# Patient Record
Sex: MALE | Race: WHITE | NOT HISPANIC OR LATINO | Employment: OTHER | URBAN - METROPOLITAN AREA
[De-identification: names, ages, dates, MRNs, and addresses within clinical notes are randomized per-mention and may not be internally consistent; named-entity substitution may affect disease eponyms.]

---

## 2017-03-16 ENCOUNTER — ALLSCRIPTS OFFICE VISIT (OUTPATIENT)
Dept: OTHER | Facility: OTHER | Age: 82
End: 2017-03-16

## 2017-07-17 ENCOUNTER — ALLSCRIPTS OFFICE VISIT (OUTPATIENT)
Dept: OTHER | Facility: OTHER | Age: 82
End: 2017-07-17

## 2017-07-17 DIAGNOSIS — F03.90 DEMENTIA WITHOUT BEHAVIORAL DISTURBANCE (HCC): ICD-10-CM

## 2017-08-22 ENCOUNTER — GENERIC CONVERSION - ENCOUNTER (OUTPATIENT)
Dept: OTHER | Facility: OTHER | Age: 82
End: 2017-08-22

## 2017-08-23 LAB
FOLATE SERPL-MCNC: >20 NG/ML
VIT B12 SERPL-MCNC: 365 PG/ML (ref 211–946)

## 2017-12-06 ENCOUNTER — ALLSCRIPTS OFFICE VISIT (OUTPATIENT)
Dept: OTHER | Facility: OTHER | Age: 82
End: 2017-12-06

## 2017-12-07 NOTE — PROGRESS NOTES
Assessment    1  Dementia (294 20) (F03 90)    Plan  Dementia    · Donepezil HCl - 10 MG Oral Tablet (Aricept); TAKE 1 TABLET BY MOUTH DAILYWITH FOOD   Rx By: Tasha Dumont; Dispense: 30 Days ; #:30 Tablet; Refill: 4;For: Dementia; MARY = N; Verified Transmission to [facility]; Last Updated By: System, SureScripts; 12/6/2017 1:19:46 PM   · Memantine HCl - 10 MG Oral Tablet; take one tablet by mouth twice daily   Rx By: Tasha Dumont; Dispense: 30 Days ; #:60 Tablet; Refill: 3;Dementia; MARY = N; Verified Transmission to [facility]; Msg to Pharmacy: After completion of the titration pack; Last Updated By: System, SureScripts; 12/6/2017 1:19:46 PM   · Follow-up visit in 4 Months Evaluation and Treatment  Follow-up  Status: Hold For -Scheduling  Requested for: [id number]   Ordered;Dementia; Ordered By: Tasha Dumont Performed:  Due: [id number]    Discussion/Summary  Discussion Summary:   Patient was advised to continue with Aricept and Namenda, he was advised to continue with mentally stimulating exercises, also was advised to be under constant supervision, to take fall and safety precautions, go to the hospital if has any worsening symptoms and call me otherwise to see me back in 4 months and follow up with family physician  Counseling Documentation With Imm: The was counseled regarding diagnostic results,-- risk factor reductions,-- prognosis,-- patient and family education,-- risks and benefits of treatment options,-- importance of compliance with treatment  Medication SE Review and Pt Understands Tx: Possible side effects of new medications were reviewed with the patient/guardian today  The treatment plan was reviewed with the patient/guardian  The patient/guardian understands and agrees with the treatment plan      Chief Complaint  Chief Complaint Free Text Note Form: The patient returns today following up on Dementia        History of Present Illness  HPI: Patient is here in follow-up for his dementia, he is accompanied with his daughter, since his last visit according to the daughter he is doing good, his memory is about the same, no motor or sensory symptoms in upper or lower extremity, he lives by himself but the daughter lives next door, he has some urinary incontinence, sleep is not disturbed, mood is good, he is able to do his ADLs, no other complaints  Appetite is good  He does not drive  Review of Systems  Neurological ROS:  Constitutional: no changes in appetite  HEENT: no eye pain  Cardiovascular: no chest pain or pressure-- and-- no swelling in the arms or legs  Respiratory: no shortness of breath with or without exertion  Gastrointestinal: no abdominal pain  Genitourinary: incontinence-- and-- feelings of urinary urgency  Musculoskeletal: no head/neck/back pain  Integumentary no rash: Marcine Grow Psychiatric: no sleep problems  Hematologic/Lymphatic: no unusual bleeding  Neurological General: no headache,-- no trouble falling asleep-- and-- no awakening at night  Neurological Mental Status: difficulty expressing/understanding speech-- and-- memory problems  Neurological Cranial Nerves: blurry or double vision, but-- no vertigo or dizziness-- and-- no dysphagia  Neurological Coordination: no vertigo or dizziness  Neurological Sensory: numbness, but-- no pain  Neurological Gait: has not had falls  Active Problems  1  Dementia (294 20) (F03 90)   2  Heart attack (410 90) (I21 9)   3  Heart disease (429 9) (I51 9)   4  Memory difficulties (780 93) (R41 3)   5  Memory loss (780 93) (R41 3)   6  Thyroid disorder (246 9) (E07 9)    Surgical History  1  History of Cataract Surgery   2  History of Coronary Artery Quadruple Venous Bypass Graft   3  History of Total Hip Replacement    Family History  Mother    1  Family history of   Father    2  Family history of   Daughter    3  Family history of anemia (V18 2) (Z83 2)   4   Family history of fibromyalgia (V17 89) (Z82 69)   5  Family history of High cholesterol    Social History     · Never a smoker   · No alcohol use   · Occasional caffeine consumption   · Retired   ·     Current Meds   1  Aspirin Low Dose 81 MG TABS; TAKE 1 TABLET DAILY; Therapy: (Recorded:16Mar2017) to Recorded   2  Donepezil HCl - 10 MG Oral Tablet; TAKE 1 TABLET BY MOUTH DAILY WITH FOOD; Therapy: 20VPI0463 to (MQTIWGSU:[id number])  Requested for: 57Zii6196; Last Rx:[id number] Ordered   3  Furosemide 20 MG Oral Tablet; Take 1 tablet daily; Therapy: (Recorded:16Mar2017) to Recorded   4  Levothyroxine Sodium 50 MCG Oral Tablet; Take 1 tablet daily; Therapy: (Recorded:16Mar2017) to Recorded   5  Memantine HCl - 10 MG Oral Tablet; take one tablet by mouth twice daily; Therapy: 95XFD8162 to (Evaluate:14Nov2017)  Requested for: 10TXX7756; Last Rx:[id number] Ordered    Allergies  1  No Known Drug Allergies    2  No Known Environmental Allergies   3  No Known Food Allergies    Vitals  Signs   Recorded: 15YDO9946 12:47PM   Heart Rate: 58, L Radial  Pulse Quality: Regular, L Radial  Respiration: 16  Systolic: 991, LUE, Sitting  Diastolic: 68, LUE, Sitting  Height: 5 ft 6 in  Weight: 159 lb   BMI Calculated: 25 66  BSA Calculated: 1 81    Physical Exam   Constitutional  General appearance: No acute distress, well appearing and well nourished  Eyes  Ophthalmoscopic examination: Vision is grossly normal  Gross visual field testing by confrontation shows no abnormalities  EOMI in both eyes  Conjunctivae clear  Eyelids normal palpebral fissures equal  Orbits exhibit normal position  No discharge from the eyes  PERRL  -- Funduscopic examination could not be done  Musculoskeletal  Gait and station: Normal gait, stance and balance  Muscle strength: Normal strength throughout  Neurologic  Orientation to person, place, and time: Normal  -- Arthur is 12/30    2nd cranial nerve: Normal    3rd, 4th, and 6th cranial nerves: Normal    5th cranial nerve: Normal    7th cranial nerve: Normal    8th cranial nerve: Abnormal  -- Patient has bilateral hearing aids    9th cranial nerve: Normal    11th cranial nerve: Normal    12th cranial nerve: Normal        Signatures   Electronically signed by : Murali Kimbrough MD; Dec  6 2017  1:24PM EST                       (Author)

## 2018-01-10 NOTE — PROCEDURES
Results/Data  Procedure: Electroencephalography (EEG)   Indications for the procedure include Memory Difficulties  Were discussed with the patient  Written consent was obtained prior to the procedure and is detailed in the patient's record  Prior to the start of the procedure, a time out was taken and the identity of the patient was confirmed via name and date of birth with the patient  The correct site(s) and the procedure to be performed were confirmed and the site(s) were marked appropriately  The positioning of the patient and the availabilty of the correct equipment were verified  Certain medications (such as anticonvulsants and tranquilizers), stimulants, and alcohol were avoided for at least 24-48 hours prior to the procedure  Procedure Note:   Performed by: Selam Escobar  Start Time: 3:30   End Time: 4:00  Electrode(s) Placement: Fp1, Fp2, F7, F3, Fz, F4, F8, T3, C3, CZ, C4, T4, T5, T6, P3, PZ, P4, O1, O2, A1 and A2  They were placed in a bipolar montage, referential montage, average reference montage, laplacian montage  The EEG was performed while the patient was exposed to of photic stimulation and awake and drowsy, but not hyperventilated and not sedated  Findings: EEG    This is a routine 18 channel EEG recording performed on an 51-year-old man  with a history of memory disturbance    Background activities during wakefulness consist of mid amplitude 7-7-1/2 cycle per second activities emanating from the posterior head region  These activities are reactive to eye opening  Intermingled with background activities are a moderate amount of lower amplitude beta activities emanating from the frontocentral head regions  Episodes of drowsiness are manifest by attenuation of background activities  Deeper stages of sleep were not seen    Photic stimulation was performed over a wide range of flash frequencies and produced a symmetrical driving response   Hyperventilation was not obtained    Concomitant EKG revealed a sinus rhythm  IMPRESSION: This is abnormal study due to slowing of background activities in the theta frequencies  This type of out amount he is consistent with diffuse cortical and subcortical dysfunction as seen in toxic, metabolic and/or neurodegenerative processes    TOAN Daily  Impression:    Post-Procedure:   the patient tolerated the procedure well  Complications: There were no complications        Signatures   Electronically signed by : Patria Londono MD; Aug 11 2016  4:29PM EST                       (Author)

## 2018-01-14 VITALS
BODY MASS INDEX: 27.32 KG/M2 | HEART RATE: 77 BPM | SYSTOLIC BLOOD PRESSURE: 116 MMHG | DIASTOLIC BLOOD PRESSURE: 64 MMHG | WEIGHT: 170 LBS | HEIGHT: 66 IN

## 2018-01-15 VITALS
HEART RATE: 76 BPM | BODY MASS INDEX: 25.74 KG/M2 | WEIGHT: 164 LBS | HEIGHT: 67 IN | SYSTOLIC BLOOD PRESSURE: 110 MMHG | DIASTOLIC BLOOD PRESSURE: 64 MMHG

## 2018-01-23 VITALS
RESPIRATION RATE: 16 BRPM | SYSTOLIC BLOOD PRESSURE: 130 MMHG | BODY MASS INDEX: 25.55 KG/M2 | HEIGHT: 66 IN | DIASTOLIC BLOOD PRESSURE: 68 MMHG | WEIGHT: 159 LBS | HEART RATE: 58 BPM

## 2018-01-25 DIAGNOSIS — F02.80 DEMENTIA ASSOCIATED WITH OTHER UNDERLYING DISEASE WITHOUT BEHAVIORAL DISTURBANCE (HCC): Primary | ICD-10-CM

## 2018-01-25 RX ORDER — DONEPEZIL HYDROCHLORIDE 10 MG/1
TABLET, FILM COATED ORAL
Qty: 30 TABLET | Refills: 0 | Status: SHIPPED | OUTPATIENT
Start: 2018-01-25 | End: 2018-06-29 | Stop reason: SDUPTHER

## 2018-04-23 DIAGNOSIS — R41.3 AMNESIA/MEMORY DISORDER: Primary | ICD-10-CM

## 2018-04-24 RX ORDER — MEMANTINE HYDROCHLORIDE 10 MG/1
TABLET ORAL
Qty: 60 TABLET | Refills: 0 | Status: SHIPPED | OUTPATIENT
Start: 2018-04-24 | End: 2018-05-26 | Stop reason: SDUPTHER

## 2018-04-30 ENCOUNTER — OFFICE VISIT (OUTPATIENT)
Dept: NEUROLOGY | Facility: CLINIC | Age: 83
End: 2018-04-30
Payer: MEDICARE

## 2018-04-30 VITALS
WEIGHT: 166 LBS | SYSTOLIC BLOOD PRESSURE: 132 MMHG | HEIGHT: 68 IN | DIASTOLIC BLOOD PRESSURE: 62 MMHG | BODY MASS INDEX: 25.16 KG/M2 | HEART RATE: 60 BPM

## 2018-04-30 DIAGNOSIS — F02.80 LATE ONSET ALZHEIMER'S DISEASE WITHOUT BEHAVIORAL DISTURBANCE (HCC): Primary | ICD-10-CM

## 2018-04-30 DIAGNOSIS — G30.1 LATE ONSET ALZHEIMER'S DISEASE WITHOUT BEHAVIORAL DISTURBANCE (HCC): Primary | ICD-10-CM

## 2018-04-30 PROCEDURE — 99213 OFFICE O/P EST LOW 20 MIN: CPT | Performed by: PSYCHIATRY & NEUROLOGY

## 2018-04-30 NOTE — PROGRESS NOTES
Sonido Johansen is a 80 y o  male  Chief Complaint   Patient presents with    Dementia       Assessment:  1  Late onset Alzheimer's disease without behavioral disturbance          Plan:    Discussion:  Patient and the family was advised for patient to continue with Aricept and Namenda, to be under constant supervision, to avoid being left alone, continue with mentally stimulating exercises, take fall and safety precautions, a DMV form was sent for patient not to drive, follow up with his other physicians, go to the hospital if has any worsening symptoms and call us otherwise to see me back in 6 months  Subjective:    HPI   Patient is accompanied with his daughter for his dementia, since his last visit he is doing reasonably okay, his daughter lives next door and takes care of the dad, and helps him with his ADLs, his appetite is good, he does not drive, sleep is good, he is on Aricept and Namenda, no other complaints  Vitals:    04/30/18 1511   BP: 132/62   BP Location: Left arm   Patient Position: Sitting   Cuff Size: Adult   Pulse: 60   Weight: 75 3 kg (166 lb)   Height: 5' 7 5" (1 715 m)       Current Medications    Current Outpatient Prescriptions:     aspirin (ASPIRIN LOW DOSE) 81 MG tablet, Take 1 tablet by mouth daily, Disp: , Rfl:     donepezil (ARICEPT) 10 mg tablet, TAKE 1 TABLET BY MOUTH DAILY WITH FOOD, Disp: 30 tablet, Rfl: 0    furosemide (LASIX) 20 mg tablet, Take 1 tablet by mouth daily, Disp: , Rfl:     levothyroxine 50 mcg tablet, Take 1 tablet by mouth daily, Disp: , Rfl:     memantine (NAMENDA) 10 mg tablet, TAKE 1 TABLET BY MOUTH TWICE DAILY, Disp: 60 tablet, Rfl: 0      Allergies  Patient has no known allergies      Past Medical History  Past Medical History:   Diagnosis Date    Dementia     [facility] [address] [facility]     Heart disease     Memory loss     Thyroid disease          Past Surgical History:  Past Surgical History:   Procedure Laterality Date    CATARACT EXTRACTION      CORONARY ARTERY BYPASS GRAFT      quadruple    TOTAL HIP ARTHROPLASTY           Family History:  Family History   Problem Relation Age of Onset    No Known Problems Mother     No Known Problems Father     Fibromyalgia Daughter     Anemia Daughter     Hyperlipidemia Daughter        Social History:   reports that he has never smoked  He has never used smokeless tobacco  He reports that he does not drink alcohol or use drugs  I have reviewed the past medical history, surgical history, social and family history, current medications, allergies vitals, review of systems, and updated this information as appropriate today  Objective:    Physical Exam    Neurological Exam    GENERAL:  Cooperative in no acute distress  Well-developed and well-nourished    HEAD and NECK   Head is atraumatic normocephalic with no lesions or masses  Neck is supple with full range of motion    CARDIOVASCULAR  Carotid Arteries-no carotid bruits  NEUROLOGIC:  Mental Status-the patient is awake alert and oriented without aphasia or apraxia, Kimberly is 9/30  Cranial Nerves: Visual fields are full to confrontation  Extraocular movements are full without nystagmus  Pupils are 2-1/2 mm and reactive  Face is symmetrical to light touch  Movements of facial expression move symmetrically  Hearing is normal to finger rub bilaterally  Soft palate lifts symmetrically  Shoulder shrug is symmetrical  Tongue is midline without atrophy  Motor: No drift is noted on arm extension  Strength is full in the upper and lower extremities with normal bulk and tone  Gait is unremarkable and needs help secondary to slight balance issues          ROS:  Review of Systems   HENT: Positive for hearing loss  Negative for trouble swallowing  Eyes: Negative for pain  Respiratory: Negative for shortness of breath  Cardiovascular: Negative for chest pain  Gastrointestinal: Negative for abdominal pain, constipation and diarrhea     Genitourinary: Positive for enuresis  Musculoskeletal: Negative for back pain and neck pain  Neurological: Positive for headaches  Negative for dizziness, tremors and numbness  Psychiatric/Behavioral: Negative for sleep disturbance

## 2018-05-01 ENCOUNTER — TELEPHONE (OUTPATIENT)
Dept: NEUROLOGY | Facility: CLINIC | Age: 83
End: 2018-05-01

## 2018-05-26 DIAGNOSIS — R41.3 AMNESIA/MEMORY DISORDER: ICD-10-CM

## 2018-05-29 RX ORDER — MEMANTINE HYDROCHLORIDE 10 MG/1
TABLET ORAL
Qty: 60 TABLET | Refills: 0 | Status: SHIPPED | OUTPATIENT
Start: 2018-05-29 | End: 2018-06-29 | Stop reason: SDUPTHER

## 2018-06-29 DIAGNOSIS — R41.3 AMNESIA/MEMORY DISORDER: ICD-10-CM

## 2018-06-29 DIAGNOSIS — F02.80 DEMENTIA ASSOCIATED WITH OTHER UNDERLYING DISEASE WITHOUT BEHAVIORAL DISTURBANCE (HCC): ICD-10-CM

## 2018-06-29 RX ORDER — DONEPEZIL HYDROCHLORIDE 10 MG/1
TABLET, FILM COATED ORAL
Qty: 30 TABLET | Refills: 0 | Status: SHIPPED | OUTPATIENT
Start: 2018-06-29 | End: 2018-08-02 | Stop reason: SDUPTHER

## 2018-06-29 RX ORDER — MEMANTINE HYDROCHLORIDE 10 MG/1
TABLET ORAL
Qty: 60 TABLET | Refills: 0 | Status: SHIPPED | OUTPATIENT
Start: 2018-06-29 | End: 2018-08-02 | Stop reason: SDUPTHER

## 2018-08-02 DIAGNOSIS — F02.80 DEMENTIA ASSOCIATED WITH OTHER UNDERLYING DISEASE WITHOUT BEHAVIORAL DISTURBANCE (HCC): ICD-10-CM

## 2018-08-02 DIAGNOSIS — R41.3 AMNESIA/MEMORY DISORDER: ICD-10-CM

## 2018-08-02 RX ORDER — MEMANTINE HYDROCHLORIDE 10 MG/1
TABLET ORAL
Qty: 60 TABLET | Refills: 0 | Status: SHIPPED | OUTPATIENT
Start: 2018-08-02 | End: 2018-09-03 | Stop reason: SDUPTHER

## 2018-08-02 RX ORDER — DONEPEZIL HYDROCHLORIDE 10 MG/1
TABLET, FILM COATED ORAL
Qty: 30 TABLET | Refills: 0 | Status: SHIPPED | OUTPATIENT
Start: 2018-08-02 | End: 2018-09-03 | Stop reason: SDUPTHER

## 2018-09-03 DIAGNOSIS — R41.3 AMNESIA/MEMORY DISORDER: ICD-10-CM

## 2018-09-03 DIAGNOSIS — F02.80 DEMENTIA ASSOCIATED WITH OTHER UNDERLYING DISEASE WITHOUT BEHAVIORAL DISTURBANCE (HCC): ICD-10-CM

## 2018-09-04 RX ORDER — DONEPEZIL HYDROCHLORIDE 10 MG/1
TABLET, FILM COATED ORAL
Qty: 30 TABLET | Refills: 0 | Status: SHIPPED | OUTPATIENT
Start: 2018-09-04 | End: 2018-12-20 | Stop reason: ALTCHOICE

## 2018-09-04 RX ORDER — MEMANTINE HYDROCHLORIDE 10 MG/1
TABLET ORAL
Qty: 60 TABLET | Refills: 0 | Status: SHIPPED | OUTPATIENT
Start: 2018-09-04 | End: 2018-12-14 | Stop reason: SDUPTHER

## 2018-10-18 DIAGNOSIS — F02.80 DEMENTIA ASSOCIATED WITH OTHER UNDERLYING DISEASE WITHOUT BEHAVIORAL DISTURBANCE (HCC): ICD-10-CM

## 2018-10-18 RX ORDER — DONEPEZIL HYDROCHLORIDE 10 MG/1
TABLET, FILM COATED ORAL
Qty: 30 TABLET | Refills: 0 | Status: SHIPPED | OUTPATIENT
Start: 2018-10-18 | End: 2018-12-17 | Stop reason: SDUPTHER

## 2018-11-21 ENCOUNTER — TELEPHONE (OUTPATIENT)
Dept: NEUROLOGY | Facility: CLINIC | Age: 83
End: 2018-11-21

## 2018-11-21 NOTE — TELEPHONE ENCOUNTER
Note written twice since the 1st note I messed up on the daughters last name and corrected it in the 2nd note, please print 2nd note and give patient's daughter

## 2018-11-21 NOTE — TELEPHONE ENCOUNTER
Pt's daughter called stated that she is her father's caretaker because he can not be left alone and she received a notice for jury duty (next week), asking for a letter excusing her from this  She would like to pick this up  Please advise if agreeable  She would like a call when ready

## 2018-11-22 DIAGNOSIS — F02.80 DEMENTIA ASSOCIATED WITH OTHER UNDERLYING DISEASE WITHOUT BEHAVIORAL DISTURBANCE (HCC): ICD-10-CM

## 2018-11-23 RX ORDER — DONEPEZIL HYDROCHLORIDE 10 MG/1
TABLET, FILM COATED ORAL
Qty: 30 TABLET | Refills: 0 | OUTPATIENT
Start: 2018-11-23

## 2018-12-14 DIAGNOSIS — R41.3 AMNESIA/MEMORY DISORDER: ICD-10-CM

## 2018-12-14 RX ORDER — MEMANTINE HYDROCHLORIDE 10 MG/1
10 TABLET ORAL 2 TIMES DAILY
Qty: 60 TABLET | Refills: 3 | Status: SHIPPED | OUTPATIENT
Start: 2018-12-14

## 2018-12-17 DIAGNOSIS — F02.80 DEMENTIA ASSOCIATED WITH OTHER UNDERLYING DISEASE WITHOUT BEHAVIORAL DISTURBANCE (HCC): ICD-10-CM

## 2018-12-17 RX ORDER — DONEPEZIL HYDROCHLORIDE 10 MG/1
10 TABLET, FILM COATED ORAL DAILY
Qty: 30 TABLET | Refills: 5 | Status: SHIPPED | OUTPATIENT
Start: 2018-12-17

## 2018-12-20 ENCOUNTER — OFFICE VISIT (OUTPATIENT)
Dept: NEUROLOGY | Facility: CLINIC | Age: 83
End: 2018-12-20
Payer: MEDICARE

## 2018-12-20 VITALS
WEIGHT: 188.8 LBS | HEIGHT: 68 IN | HEART RATE: 64 BPM | BODY MASS INDEX: 28.61 KG/M2 | DIASTOLIC BLOOD PRESSURE: 72 MMHG | SYSTOLIC BLOOD PRESSURE: 132 MMHG

## 2018-12-20 DIAGNOSIS — F02.80 LATE ONSET ALZHEIMER'S DISEASE WITHOUT BEHAVIORAL DISTURBANCE (HCC): Primary | ICD-10-CM

## 2018-12-20 DIAGNOSIS — G30.1 LATE ONSET ALZHEIMER'S DISEASE WITHOUT BEHAVIORAL DISTURBANCE (HCC): Primary | ICD-10-CM

## 2018-12-20 PROCEDURE — 99213 OFFICE O/P EST LOW 20 MIN: CPT | Performed by: PSYCHIATRY & NEUROLOGY

## 2018-12-20 NOTE — PROGRESS NOTES
Maxi Mercado is a 80 y o  male  Chief Complaint   Patient presents with    Follow-up     Alzheimer's and Dementia       Assessment:  1  Late onset Alzheimer's disease without behavioral disturbance        Plan:    Discussion:  Patient was advised to continue with Aricept and Namenda, also was advised to continue with mentally stimulating exercises, daughter was advised that patient should not be left alone, also was advised to be under constant supervision and not to live alone, family understands that,, I told them if they need help they can contact office of aging, daughter tells me that she is going to be with him, he is getting blood work ordered by the family physician, I have advised the family to discuss with him and make sure the patient does not have any UTI, go to the hospital if has any worsening symptoms and call me otherwise to see me back in 4 months and follow up with his other physicians  Subjective:    HPI   Patient is here in follow-up accompanied with his daughter for history of dementia, since his last visit according to the daughter he is slightly worse, in the last few days her little more disoriented, but in the office he is able to recognize me and followed simple commands, he has occasionally incontinence of urine, no headaches no dizziness, appetite is decent, his last Hagerman was 9 on 10, no falls, he ambulates with a cane, he has lost 1 hearing aid and is waiting for the new one, no other complaints      Vitals:    12/20/18 1513   BP: 132/72   BP Location: Left arm   Patient Position: Sitting   Cuff Size: Standard   Pulse: 64   Weight: 85 6 kg (188 lb 12 8 oz)   Height: 5' 7 5" (1 715 m)       Current Medications    Current Outpatient Prescriptions:     aspirin (ASPIRIN LOW DOSE) 81 MG tablet, Take 1 tablet by mouth daily, Disp: , Rfl:     donepezil (ARICEPT) 10 mg tablet, Take 1 tablet (10 mg total) by mouth daily With food, Disp: 30 tablet, Rfl: 5    furosemide (LASIX) 20 mg tablet, Take 1 tablet by mouth daily, Disp: , Rfl:     levothyroxine 50 mcg tablet, Take 1 tablet by mouth daily, Disp: , Rfl:     memantine (NAMENDA) 10 mg tablet, Take 1 tablet (10 mg total) by mouth 2 (two) times a day, Disp: 60 tablet, Rfl: 3      Allergies  Patient has no known allergies  Past Medical History  Past Medical History:   Diagnosis Date    Dementia     Heart attack ([facility] [address] [facility]     Heart disease     Memory loss     Thyroid disease          Past Surgical History:  Past Surgical History:   Procedure Laterality Date    CATARACT EXTRACTION      CORONARY ARTERY BYPASS GRAFT      quadruple    TOTAL HIP ARTHROPLASTY           Family History:  Family History   Problem Relation Age of Onset    No Known Problems Mother     No Known Problems Father     Fibromyalgia Daughter     Anemia Daughter     Hyperlipidemia Daughter        Social History:   reports that he has never smoked  He has never used smokeless tobacco  He reports that he does not drink alcohol or use drugs  I have reviewed the past medical history, surgical history, social and family history, current medications, allergies vitals, review of systems, and updated this information as appropriate today  Objective:    Physical Exam    Neurological Exam    GENERAL:  Cooperative in no acute distress  Well-developed and well-nourished    HEAD and NECK   Head is atraumatic normocephalic with no lesions or masses  Neck is supple with full range of motion    CARDIOVASCULAR  Carotid Arteries-no carotid bruits  NEUROLOGIC:  Mental Status-the patient is awake alert and oriented to simple commands without aphasia or apraxia  Cranial Nerves: Visual fields are full to confrontation  Extraocular movements are full without nystagmus  Pupils are 2-1/2 mm and reactive  Face is symmetrical to light touch  Movements of facial expression move symmetrically  Hearing is decreased  to finger rub bilaterally  Soft palate lifts symmetrically   Shoulder shrug is symmetrical  Tongue is midline without atrophy  Motor: No drift is noted on arm extension  Strength is full in the upper and lower extremities with normal bulk and tone  Ambulates with a cane          ROS:  Review of Systems   Constitutional: Positive for fatigue  HENT: Positive for hearing loss (hearing aids )  Eyes:        Watery eyes  Double vision    Respiratory: Negative  Cardiovascular: Positive for leg swelling (both legs edema)  Gastrointestinal: Negative  Endocrine: Positive for cold intolerance  Genitourinary: Negative  Musculoskeletal: Negative  Skin: Negative  Allergic/Immunologic: Negative  Neurological: Positive for tremors (right arm) and speech difficulty (diffulty getting words out )  Increased memory loss in past 3 days per patient's daughter   Hematological: Bruises/bleeds easily (bruising easily on hands )  Psychiatric/Behavioral: Positive for confusion and decreased concentration  The patient is nervous/anxious (slight anxiety per daughter)

## 2019-01-10 DIAGNOSIS — R41.3 AMNESIA/MEMORY DISORDER: ICD-10-CM

## 2019-01-10 RX ORDER — MEMANTINE HYDROCHLORIDE 10 MG/1
TABLET ORAL
Qty: 60 TABLET | Refills: 0 | Status: SHIPPED | OUTPATIENT
Start: 2019-01-10 | End: 2019-06-17

## 2019-01-21 RX ORDER — NITROFURANTOIN 25; 75 MG/1; MG/1
100 CAPSULE ORAL 2 TIMES DAILY
Refills: 0 | Status: ON HOLD | COMMUNITY
Start: 2018-12-21 | End: 2019-02-05 | Stop reason: ALTCHOICE

## 2019-01-22 ENCOUNTER — OFFICE VISIT (OUTPATIENT)
Dept: GASTROENTEROLOGY | Facility: CLINIC | Age: 84
End: 2019-01-22
Payer: MEDICARE

## 2019-01-22 VITALS
DIASTOLIC BLOOD PRESSURE: 74 MMHG | WEIGHT: 161.8 LBS | HEART RATE: 70 BPM | BODY MASS INDEX: 24.97 KG/M2 | SYSTOLIC BLOOD PRESSURE: 122 MMHG

## 2019-01-22 DIAGNOSIS — R13.14 PHARYNGOESOPHAGEAL DYSPHAGIA: Primary | ICD-10-CM

## 2019-01-22 PROBLEM — R13.10 DYSPHAGIA: Status: ACTIVE | Noted: 2019-01-22

## 2019-01-22 PROCEDURE — 99203 OFFICE O/P NEW LOW 30 MIN: CPT | Performed by: INTERNAL MEDICINE

## 2019-01-22 RX ORDER — FUROSEMIDE 40 MG/1
40 TABLET ORAL DAILY
COMMUNITY
Start: 2019-01-21

## 2019-01-22 NOTE — PROGRESS NOTES
[facility] - Outpatient Consultation  Amol Figueroa 80 y o  male MRN: [id number]  Encounter: [id number]          ASSESSMENT AND PLAN:      1  Pharyngoesophageal dysphagia  Worsening difficulty swallowing food - although it is difficult to discern it seems it may be oropharyngeal    Will plan EGD    ______________________________________________________________________    HPI:  80year old male presents with complaints of difficulty swallowing solids over the past few months  There is no pain or heartburn associated with this  He denies any choking sensation  No nausea/vomiting/hematemesis  HE has lost approximately 12lbs over the past few months  He has been diagnosed with Alzheimer's which, per his daughter, has been advancing  REVIEW OF SYSTEMS:    CONSTITUTIONAL: Denies any fever, chills, rigors, and weight loss  HEENT: No earache or tinnitus  Denies hearing loss or visual disturbances  CARDIOVASCULAR: No chest pain or palpitations  RESPIRATORY: Denies any cough, hemoptysis, shortness of breath or dyspnea on exertion  GASTROINTESTINAL: As noted in the History of Present Illness  GENITOURINARY: No problems with urination  Denies any hematuria or dysuria  NEUROLOGIC: No dizziness or vertigo, denies headaches  MUSCULOSKELETAL: Denies any muscle or joint pain  SKIN: Denies skin rashes or itching  ENDOCRINE: Denies excessive thirst  Denies intolerance to heat or cold  PSYCHOSOCIAL: Denies depression or anxiety  Denies any recent memory loss         Historical Information   Past Medical History:   Diagnosis Date    Dementia     Heart attack [facility] [address]     Heart disease     Memory loss     Thyroid disease      Past Surgical History:   Procedure Laterality Date    CATARACT EXTRACTION      CORONARY ARTERY BYPASS GRAFT      quadruple    TOTAL HIP ARTHROPLASTY       Social History   History   Alcohol Use No     History   Drug Use No     History   Smoking Status    Never Smoker   Smokeless Tobacco    Never Used     Family History   Problem Relation Age of Onset    No Known Problems Mother     No Known Problems Father     Fibromyalgia Daughter     Anemia Daughter     Hyperlipidemia Daughter        Meds/Allergies       Current Outpatient Prescriptions:     aspirin (ASPIRIN LOW DOSE) 81 MG tablet    donepezil (ARICEPT) 10 mg tablet    levothyroxine 50 mcg tablet    memantine (NAMENDA) 10 mg tablet    memantine (NAMENDA) 10 mg tablet    nitrofurantoin (MACROBID) 100 mg capsule    furosemide (LASIX) 40 mg tablet    No Known Allergies        Objective     Blood pressure 122/74, pulse 70, weight 73 4 kg (161 lb 12 8 oz)  Body mass index is 24 97 kg/m²  PHYSICAL EXAM:      General Appearance:   Alert, cooperative, no distress   HEENT:   Normocephalic, atraumatic, anicteric      Neck:  Supple, symmetrical, trachea midline   Lungs:   Clear to auscultation bilaterally; no rales, rhonchi or wheezing; respirations unlabored    Heart[de-identified]   Regular rate and rhythm; no murmur, rub, or gallop  Abdomen:   Soft, non-tender, non-distended; normal bowel sounds; no masses, no organomegaly    Genitalia:   Deferred    Rectal:   Deferred    Extremities:  No cyanosis, clubbing or edema    Pulses:  2+ and symmetric    Skin:  No jaundice, rashes, or lesions    Lymph nodes:  No palpable cervical lymphadenopathy        Lab Results:   No visits with results within 1 Day(s) from this visit  Latest known visit with results is:   Generic Conversion - Encounter on 08/22/2017   Component Date Value    Vitamin B-12 08/22/2017 365     Folate 08/22/2017 >20 0          Radiology Results:   No results found

## 2019-01-24 ENCOUNTER — ANESTHESIA (OUTPATIENT)
Dept: PERIOP | Facility: HOSPITAL | Age: 84
End: 2019-01-24

## 2019-01-24 ENCOUNTER — ANESTHESIA EVENT (OUTPATIENT)
Dept: PERIOP | Facility: HOSPITAL | Age: 84
End: 2019-01-24

## 2019-01-24 ENCOUNTER — TELEPHONE (OUTPATIENT)
Dept: GASTROENTEROLOGY | Facility: CLINIC | Age: 84
End: 2019-01-24

## 2019-01-24 NOTE — TELEPHONE ENCOUNTER
Pt called to reschedule egd that was supposed to be done this morning  Please call back to reschedule   thanks

## 2019-02-05 ENCOUNTER — HOSPITAL ENCOUNTER (OUTPATIENT)
Facility: HOSPITAL | Age: 84
Setting detail: OUTPATIENT SURGERY
Discharge: HOME | End: 2019-02-05
Attending: INTERNAL MEDICINE | Admitting: INTERNAL MEDICINE
Payer: MEDICARE

## 2019-02-05 ENCOUNTER — ANESTHESIA (OUTPATIENT)
Dept: PERIOP | Facility: HOSPITAL | Age: 84
End: 2019-02-05
Payer: MEDICARE

## 2019-02-05 ENCOUNTER — ANESTHESIA EVENT (OUTPATIENT)
Dept: PERIOP | Facility: HOSPITAL | Age: 84
End: 2019-02-05
Payer: MEDICARE

## 2019-02-05 VITALS
HEIGHT: 68 IN | RESPIRATION RATE: 19 BRPM | HEART RATE: 49 BPM | WEIGHT: 146.16 LBS | OXYGEN SATURATION: 100 % | DIASTOLIC BLOOD PRESSURE: 67 MMHG | SYSTOLIC BLOOD PRESSURE: 105 MMHG | BODY MASS INDEX: 22.15 KG/M2 | TEMPERATURE: 97.5 F

## 2019-02-05 PROCEDURE — 43248 EGD GUIDE WIRE INSERTION: CPT | Performed by: INTERNAL MEDICINE

## 2019-02-05 RX ORDER — PROPOFOL 10 MG/ML
INJECTION, EMULSION INTRAVENOUS AS NEEDED
Status: DISCONTINUED | OUTPATIENT
Start: 2019-02-05 | End: 2019-02-05 | Stop reason: SURG

## 2019-02-05 RX ORDER — LIDOCAINE HYDROCHLORIDE 10 MG/ML
INJECTION, SOLUTION EPIDURAL; INFILTRATION; INTRACAUDAL; PERINEURAL AS NEEDED
Status: DISCONTINUED | OUTPATIENT
Start: 2019-02-05 | End: 2019-02-05 | Stop reason: SURG

## 2019-02-05 RX ORDER — SODIUM CHLORIDE, SODIUM LACTATE, POTASSIUM CHLORIDE, CALCIUM CHLORIDE 600; 310; 30; 20 MG/100ML; MG/100ML; MG/100ML; MG/100ML
125 INJECTION, SOLUTION INTRAVENOUS CONTINUOUS
Status: DISCONTINUED | OUTPATIENT
Start: 2019-02-05 | End: 2019-02-05 | Stop reason: HOSPADM

## 2019-02-05 RX ADMIN — PROPOFOL 100 MG: 10 INJECTION, EMULSION INTRAVENOUS at 09:08

## 2019-02-05 RX ADMIN — LIDOCAINE HYDROCHLORIDE 20 MG: 10 INJECTION, SOLUTION EPIDURAL; INFILTRATION; INTRACAUDAL; PERINEURAL at 09:08

## 2019-02-05 RX ADMIN — PROPOFOL 20 MG: 10 INJECTION, EMULSION INTRAVENOUS at 09:12

## 2019-02-05 RX ADMIN — PROPOFOL 10 MG: 10 INJECTION, EMULSION INTRAVENOUS at 09:10

## 2019-02-05 RX ADMIN — SODIUM CHLORIDE, SODIUM LACTATE, POTASSIUM CHLORIDE, AND CALCIUM CHLORIDE 125 ML/HR: .6; .31; .03; .02 INJECTION, SOLUTION INTRAVENOUS at 08:43

## 2019-02-05 NOTE — H&P
History and Physical -  [facility]  Laquita Johnston 80 y o  male MRN: [id number]      HPI: Laquita Johnston is a 80y o  year old male who presents for the evaluation of dysphagia      REVIEW OF SYSTEMS: Per the HPI, and otherwise unremarkable  Historical Information   Past Medical History:   Diagnosis Date    Dementia     Heart attack ([address] )     Heart disease     Memory loss     Thyroid disease      Past Surgical History:   Procedure Laterality Date    CATARACT EXTRACTION      CORONARY ARTERY BYPASS GRAFT      quadruple    TOTAL HIP ARTHROPLASTY       Social History   History   Alcohol Use No     History   Drug Use No     History   Smoking Status    Never Smoker   Smokeless Tobacco    Never Used     Family History   Problem Relation Age of Onset    No Known Problems Mother     No Known Problems Father     Fibromyalgia Daughter     Anemia Daughter     Hyperlipidemia Daughter        Meds/Allergies     Prescriptions Prior to Admission   Medication    aspirin (ASPIRIN LOW DOSE) 81 MG tablet    donepezil (ARICEPT) 10 mg tablet    furosemide (LASIX) 40 mg tablet    levothyroxine 50 mcg tablet    memantine (NAMENDA) 10 mg tablet    memantine (NAMENDA) 10 mg tablet       No Known Allergies    Objective     Blood pressure 129/56, pulse 59, temperature 97 6 °F (36 4 °C), temperature source Oral, resp  rate 21, height 5' 8" (1 727 m), weight 66 3 kg (146 lb 2 6 oz), SpO2 99 %  PHYSICAL EXAM    Gen: NAD  CV: RRR  CHEST: Clear  ABD: soft, NT/ND  EXT: no edema      ASSESSMENT/PLAN:  This is a 80y o  year old male here for the evaluation of dysphagia, and he is stable and optimized for his procedure      Perform esophagogastroduodenoscopy with probable dilation of the esophagus

## 2019-02-05 NOTE — ANESTHESIA POSTPROCEDURE EVALUATION
Post-Op Assessment Note      CV Status:  Stable    Mental Status:  Lethargic    Hydration Status:  Stable    PONV Controlled:  None    Airway Patency:  Patent and adequate    Post Op Vitals Reviewed: Yes          Staff: CRNA       Comments: 6lpm/mask          BP   120/70   Temp      Pulse  55   Resp   16   SpO2   99

## 2019-02-05 NOTE — DISCHARGE INSTRUCTIONS
Upper Endoscopy   WHAT YOU NEED TO KNOW:   An upper endoscopy is also called an upper gastrointestinal (GI) endoscopy, or an esophagogastroduodenoscopy (EGD)  You may feel bloated, gassy, or have some abdominal discomfort after your procedure  Your throat may be sore for 24 to 36 hours  You may burp or pass gas from air that is still inside your body  DISCHARGE INSTRUCTIONS:   Call 911 for any of the following:   · You have sudden chest pain or trouble breathing  Seek care immediately if:   · You feel dizzy or faint  · You have trouble swallowing  · Your bowel movements are very dark or black  · Your abdomen is hard and firm and you have severe pain  · You vomit blood  Contact your healthcare provider if:   · You feel full or bloated and cannot burp or pass gas  · You have not had a bowel movement for 3 days after your procedure  · You have neck pain  · You have a fever or chills  · You have nausea or are vomiting  · You have a rash or hives  · You have questions or concerns about your endoscopy  Relieve a sore throat:  Suck on throat lozenges or crushed ice  Gargle with a small amount of warm salt water  Mix 1 teaspoon of salt and 1 cup of warm water to make salt water  Relieve gas and discomfort from bloating:  Lie on your right side with a heating pad on your abdomen  Take short walks to help pass gas  Eat small meals until bloating is relieved  Rest after your procedure: You have been given medicine to relax you  Do not  drive or make important decisions until the day after your procedure  Return to your normal activity as directed  You can usually return to work the day after your procedure  Follow up with your healthcare provider as directed:  Write down your questions so you remember to ask them during your visits     © 2017 [address] is for End User's use only and may not be sold, redistributed or otherwise used for commercial purposes  All illustrations and images included in CareNotes® are the copyrighted property of A D A M , Inc  or [employer] Wor[employer]  The above information is an  only  It is not intended as medical advice for individual conditions or treatments  Talk to your doctor, nurse or pharmacist before following any medical regimen to see if it is safe and effective for you

## 2019-02-05 NOTE — ANESTHESIA PREPROCEDURE EVALUATION
Review of Systems/Medical History  Patient summary reviewed  Chart reviewed      Cardiovascular  Past MI > 6 months, CAD , CAD status: 3VD, History of CABG,    Pulmonary  Negative pulmonary ROS        GI/Hepatic  Negative GI/hepatic ROS          Negative  ROS        Endo/Other  History of thyroid disease ,      GYN  Negative gynecology ROS          Hematology  Negative hematology ROS      Musculoskeletal       Neurology  Negative neurology ROS      Psychology   Negative psychology ROS              Physical Exam    Airway    Mallampati score: II  TM Distance: <3 FB  Neck ROM: limited     Dental   upper dentures and lower dentures,     Cardiovascular  Rhythm: regular, Rate: normal, Murmur,     Pulmonary  Breath sounds clear to auscultation,     Other Findings  Difficulty swallowing       Anesthesia Plan  ASA Score- 3     Anesthesia Type- IV sedation with anesthesia with ASA Monitors  Additional Monitors:   Airway Plan:         Plan Factors-    Induction- intravenous  Postoperative Plan- Plan for postoperative opioid use  Informed Consent- Anesthetic plan and risks discussed with patient and spouse  I personally reviewed this patient with the CRNA  Discussed and agreed on the Anesthesia Plan with the CRNA  Shirley Larson

## 2019-02-05 NOTE — OP NOTE
ESOPHAGOGASTRODUODENOSCOPY    PROCEDURE: EGD    SEDATION: Monitored anesthesia care, check anesthesia records    ASA Class: 3    INDICATIONS:  Dysphagia to solid foods    CONSENT:  Informed consent was obtained for the procedure, including sedation after explaining the risks and benefits of the procedure  Risks including but not limited to bleeding, perforation, infection, and missed lesion  PREPARATION:   Telemetry, pulse oximetry, blood pressure were monitored throughout the procedure  Patient was identified by myself both verbally and by visual inspection of ID band  DESCRIPTION:   Patient was placed in the left lateral decubitus position and was sedated with the above medication  The gastroscope was introduced in to the oropharynx and the esophagus was intubated under direct visualization  Scope was passed down the esophagus up to 2nd part of the duodenum  A careful inspection was made as the gastroscope was withdrawn, including a retroflexed view of the stomach; findings and interventions are described below  FINDINGS:    #1  Esophagus- the proximal, mid, distal esophagus were normal in appearance  No esophageal stricture was identified  A 54 Filipino Savary dilator was passed effortlessly through the esophagus without resistance or apparent complications over a guidewire  There was no blood identified on the esophageal dilator at the completion of the dilation  #2  Stomach- the cardia, fundus, body, antrum, and pylorus were normal in appearance  The pylorus was patent and traversed without difficulty  #3  Duodenum- the 1st and 2nd portion of the duodenum were normal          IMPRESSIONS:      1  Dysphagia to solid foods with a normal appearing esophagus, status post empiric dilation with 54 Filipino Savary dilator with no apparent complications  RECOMMENDATIONS:     1  Resume regular diet without restrictions  2   If the patient's subjective complaint of dysphagia persists then consider esophageal manometry     COMPLICATIONS:  None; patient tolerated the procedure well      SPECIMENS:  * No specimens in log *    ESTIMATED BLOOD LOSS:  Minimal

## 2019-02-05 NOTE — DISCHARGE INSTR - AVS FIRST PAGE
ESOPHAGOGASTRODUODENOSCOPY    PROCEDURE: EGD    SEDATION: Monitored anesthesia care, check anesthesia records    ASA Class: 3    INDICATIONS:  Dysphagia to solid foods    CONSENT:  Informed consent was obtained for the procedure, including sedation after explaining the risks and benefits of the procedure  Risks including but not limited to bleeding, perforation, infection, and missed lesion  PREPARATION:   Telemetry, pulse oximetry, blood pressure were monitored throughout the procedure  Patient was identified by myself both verbally and by visual inspection of ID band  DESCRIPTION:   Patient was placed in the left lateral decubitus position and was sedated with the above medication  The gastroscope was introduced in to the oropharynx and the esophagus was intubated under direct visualization  Scope was passed down the esophagus up to 2nd part of the duodenum  A careful inspection was made as the gastroscope was withdrawn, including a retroflexed view of the stomach; findings and interventions are described below  FINDINGS:    #1  Esophagus- the proximal, mid, distal esophagus were normal in appearance  No esophageal stricture was identified  A 54 [place]ian Savary dilator was passed effortlessly through the esophagus without resistance or apparent complications over a guidewire  There was no blood identified on the esophageal dilator at the completion of the dilation  #2  Stomach- the cardia, fundus, body, antrum, and pylorus were normal in appearance  The pylorus was patent and traversed without difficulty  #3  Duodenum- the 1st and 2nd portion of the duodenum were normal          IMPRESSIONS:      1  Dysphagia to solid foods with a normal appearing esophagus, status post empiric dilation with 54 Lithuanian Savary dilator with no apparent complications  RECOMMENDATIONS:     1  Resume regular diet without restrictions  2   If the patient's subjective complaint of dysphagia persists then consider esophageal manometry     COMPLICATIONS:  None; patient tolerated the procedure well      SPECIMENS:  * No specimens in log *    ESTIMATED BLOOD LOSS:  Minimal

## 2019-02-21 ENCOUNTER — TELEPHONE (OUTPATIENT)
Dept: GASTROENTEROLOGY | Facility: CLINIC | Age: 84
End: 2019-02-21

## 2019-02-21 DIAGNOSIS — R13.12 OROPHARYNGEAL DYSPHAGIA: Primary | ICD-10-CM

## 2019-02-21 NOTE — TELEPHONE ENCOUNTER
Dr Sebastian Summers - Patient's EC called  Patient is having difficulty swallowing and eating  EGD performed on 02/05/19   Please call [phone] ty

## 2019-02-21 NOTE — TELEPHONE ENCOUNTER
Patient needs a speech and swallow evaluation - suspect his dysphagia is secondary to alzheimers +/- a transfer dysphagia

## 2019-02-21 NOTE — TELEPHONE ENCOUNTER
Called and LMOM advising to call back office so we can relay PA's message regarding pt  Message:  PA ordered a FL barium swallow video w speech and to call Central scheduling at [phone] to schedule appt

## 2019-03-07 ENCOUNTER — TELEPHONE (OUTPATIENT)
Dept: GASTROENTEROLOGY | Facility: CLINIC | Age: 84
End: 2019-03-07

## 2019-03-07 NOTE — TELEPHONE ENCOUNTER
Called to speak with pt but daughter answered and said she had to put pt in a facility  Daughter said her father is actually doing better with his swallowing since he has been in the facility

## 2019-03-12 ENCOUNTER — TELEPHONE (OUTPATIENT)
Dept: GASTROENTEROLOGY | Facility: CLINIC | Age: 84
End: 2019-03-12

## 2019-03-12 NOTE — TELEPHONE ENCOUNTER
Dr Rosalva Mcmillan _ Patient did not show for Video Swallow 03/12/19  Radiologist tried to contact patient, no answer

## 2019-03-12 NOTE — TELEPHONE ENCOUNTER
At phone number listed is the jimmy  Pt is now in an assisted living center  Jimmy was/is sick and was unable to take him  Gave daughter [facility] scheduling phone number

## 2019-03-21 ENCOUNTER — HOSPITAL ENCOUNTER (OUTPATIENT)
Dept: RADIOLOGY | Facility: HOSPITAL | Age: 84
Discharge: HOME | End: 2019-03-21
Attending: INTERNAL MEDICINE
Payer: MEDICARE

## 2019-03-21 DIAGNOSIS — R13.12 OROPHARYNGEAL DYSPHAGIA: ICD-10-CM

## 2019-03-21 PROCEDURE — 74230 X-RAY XM SWLNG FUNCJ C+: CPT

## 2019-03-21 PROCEDURE — 92611 MOTION FLUOROSCOPY/SWALLOW: CPT

## 2019-03-21 NOTE — PROCEDURES
Video Barium Swallow Study       Patient Name: Kalli Erwin  RJIAC'A Date: 3/21/2019        Past Medical History     Past Medical History:   Diagnosis Date    Dementia     Heart attack ([address] )     Heart disease     Memory loss     Thyroid disease         Past Surgical History  Past Surgical History:   Procedure Laterality Date    CATARACT EXTRACTION      CORONARY ARTERY BYPASS GRAFT      quadruple    IN ESOPHAGOGASTRODUODENOSCOPY TRANSORAL DIAGNOSTIC N/A 2/5/2019    Procedure: ESOPHAGOGASTRODUODENOSCOPY (EGD); Surgeon: Renu Vincent DO;  Location: [facility]; Service: Gastroenterology    TOTAL HIP ARTHROPLASTY             General Information: This 80 y o  male resident of [facility] was seen today on an outpatient basis  He was referred for a video swallow study by his gastroenterologist, MARK Howard , due to recurrent complaint of difficulty swallowing  The patient himself was confused and unable to provide history; history was provided by his daughter, Salazar Butcher, who accompanied him to the study  Salazar Butcher states that for the past few months, the patient has had occasional inability to swallow solids, with complaint of globus sensation ("it gets stuck in his throat")  This occurs several times per week  At times he has had to expectorate the bolus because he could not swallow it  She notes that he also coughs with liquid intake one or two times per week  The patient has not had any incidents of choking requiring the Heimlich maneuver, nor has he had any respiratory illness or pneumonia in the last few months  He was evaluated by Dr Tanya Almaraz and an EGD was performed on 2/5/19 which was completely normal  Therefore, the patient was referred for a video swallow study for further investigation of the complaint of dysphagia       Previous medical history includes the above and is primarily significant for dementia, which was diagnosed two years ago   Salazar Butcher notes that the patient's confusion has significantly increased in the last few months, concurrent with the increased difficulty in swallowing  The patient was previously living with her, but required placement for care a couple of months ago  Oscar Fontanez feels his swallowing has actually improved since he was placed at [facility] [address], although she is unsure why  He is on a Regular/Thin Liquid diet there       The study was conducted in lateral view for trials by mouth  Textures assessed during this study include nectar-thick liquids, thin liquids, puree, and regular solids  A variety of strategies were attempted during the study, including chin tuck, preparatory set, cued cough, and cued swallows  However, the patient did not follow commands or attempt any of these strategies even with maximum verbal, visual, and tactile cues        Oral Stage:  Base of tongue retraction was both delayed and reduced in range of motion across all trials  Velopharyngeal closure was within normal limits  The patient habitually tilted his head very far back during acceptance and initial bolus manipulation of all trials except regular solids  He was not responsive to verbal or tactile cues to keep his head level  [place]-Thick Liquids via Cup Sip (1/2 ounce in cup): Pt continued to attempt to drink more from cup long after entire 1/2 ounce bolus was in his mouth  Bolus transfer to the pyriform sinuses prior to initiation of the pharyngeal swallow, with concurrent shallow laryngeal penetration  Thin Liquids via Self-Regulated Cup Sip: Given an open cup, the patient completely filled his oral cavity with liquid until it began to leak from the oral cavity to the valleculae  Bolus transfer to the pyriform sinuses prior to initiation of the pharyngeal swallow, with concurrent deep laryngeal penetration completely filling the larynx to the level of the true vocal folds  Thin Liquids via Cup Sip (1/2 ounce in cup):  Pt continued to attempt to drink more from cup long after entire 1/2 ounce bolus was in his mouth  Bolus transfer to the pyriform sinuses prior to initiation of the pharyngeal swallow, with concurrent deep laryngeal penetration  Puree: Lingual pumping  Bolus transfer to the valleculae, with passive spillage from there to the pyriform sinuses, prior to initiation of the pharyngeal swallow  Regular Solids: Mastication lasting 22 seconds for 1-cm cube bolus  Slight leakage to valleculae during mastication            Pharyngeal Stage: An osteophyte was observed at the levels of C5-6-7 impinging on the pyriform sinuses, the upper esophageal sphincter, and proximal esophagus  This likely contributes to the patient's residuals as described below, as well as his complaint of difficulty swallowing  Epiglottic inversion and retraction were complete but sluggish  This also contributes to bolus retention and residuals  Hyolaryngeal elevation was minimal across all trials  Pharyngeal contraction was within normal limits  Nectar-Thick Liquids via Cup Sip (1/2 ounce in cup): Moderate residuals in the larynx, pyriform sinuses, and oral cavity after the swallow  The patient spontaneously and independently swallowed an additional three times, which had minimal effect on residuals  More than 90 seconds later, the patient spontaneously and independently cleared his throat and swallowed again, which successfully cleared all residuals  Thin Liquids via Self-Regulated Cup Sip: Severe pharyngeal residuals after the swallow, filling the larynx and pharynx to the level of the base of the epiglottis  Moderate oral residuals also observed  The patient spontaneously and independently swallowed an additional four times, after which only mild vallecular and pyriform sinus residuals remained  No aspiration was observed during this process, although there was no sensory response at any point to bolus contact with the vocal folds       Thin Liquids via Cup Sip (1/2 ounce in cup): Moderate residuals in the larynx, pyriform sinuses, and oral cavity after the swallow  The patient spontaneously and independently swallowed an additional three times, which cleared the residuals  No aspiration was observed during this process  Puree: Approximately 50% of the bolus passed into the esophagus during the swallow, with ~25% retained in the valleculae and ~25% retained in the pyriform sinuses  The patient spontaneously and independently performed a second swallow, which cleared the residuals  Regular Solids: Moderate oral, vallecular, and pyriform sinus residuals after the swallow  The patient spontaneously and independently swallowed an additional three times, which slowly and gradually cleared the residuals  Following the third of the four total swallows, a column of residuals was observed ranging from the pyriform sinuses through the upper esophageal sphincter and into the proximal esophagus  The patient reported "It's stuck, I don't know if I can swallow it " He then swallowed the final time and fully cleared the residuals  Esophageal Stage:  Not formally assessed  Assessment Summary:  The patient presents with moderate oral and pharyngeal dysphagia, characterized by impulsive intake, reduced base of tongue retraction, delayed pharyngeal swallow initiation, minimal hyolaryngeal elevation, sluggish epiglottic inversion, and suspected reduced sensation throughout the oral and pharyngeal space (including the larynx)  Incidental findings included an osteophyte impinging on the distal pharynx, upper esophageal sphincter, and proximal esophagus  No aspiration was observed during the study, although laryngeal penetration was seen with liquids, including penetration of thin liquids to the level of the true vocal folds without sensory response   Thickened liquids are actually not a safer alternative for this patient as he has more difficulty clearing them from his larynx when penetration occurs  He may benefit from speech therapy for dysphagia treatment if he is able to consistently participate (he had noted difficulty following commands during this exam)        Recommendations:  1  Soft solids and Thin Liquids  2  Small bites and sips at a slow rate of intake  3  Aspiration precautions, including upright positioning for all intake by mouth  4  May wish to consider speech therapy for treatment of dysphagia, although prognosis for improvement is guarded given the patient's dementia         Results and recommendations were discussed in detail with the patient's daughter, Ally Fischer, immediately following the study         [place], [id number] [facility]  Speech-Language Pathologist

## 2019-04-19 ENCOUNTER — TELEPHONE (OUTPATIENT)
Dept: NEUROLOGY | Facility: CLINIC | Age: 84
End: 2019-04-19

## 2019-06-03 ENCOUNTER — TELEPHONE (OUTPATIENT)
Dept: NEUROLOGY | Facility: CLINIC | Age: 84
End: 2019-06-03

## 2019-06-17 ENCOUNTER — HOSPITAL ENCOUNTER (EMERGENCY)
Facility: HOSPITAL | Age: 84
Discharge: HOME | End: 2019-06-18
Attending: EMERGENCY MEDICINE
Payer: MEDICARE

## 2019-06-17 VITALS
HEART RATE: 43 BPM | SYSTOLIC BLOOD PRESSURE: 117 MMHG | HEIGHT: 68 IN | WEIGHT: 141.54 LBS | TEMPERATURE: 97.7 F | OXYGEN SATURATION: 97 % | BODY MASS INDEX: 21.45 KG/M2 | DIASTOLIC BLOOD PRESSURE: 57 MMHG | RESPIRATION RATE: 16 BRPM

## 2019-06-17 DIAGNOSIS — R31.9 HEMATURIA: Primary | ICD-10-CM

## 2019-06-17 PROCEDURE — 99283 EMERGENCY DEPT VISIT LOW MDM: CPT

## 2019-06-17 RX ORDER — GUAIFENESIN 200 MG/1
400 TABLET ORAL 2 TIMES DAILY
COMMUNITY
End: 2020-09-16 | Stop reason: HOSPADM

## 2019-06-17 RX ORDER — SIMVASTATIN 40 MG
40 TABLET ORAL
COMMUNITY

## 2019-06-17 RX ORDER — POTASSIUM CHLORIDE 750 MG/1
10 CAPSULE, EXTENDED RELEASE ORAL DAILY
COMMUNITY
End: 2020-09-16 | Stop reason: HOSPADM

## 2019-06-18 PROBLEM — R31.9 HEMATURIA: Status: ACTIVE | Noted: 2019-06-18

## 2019-06-18 PROCEDURE — 99283 EMERGENCY DEPT VISIT LOW MDM: CPT | Performed by: EMERGENCY MEDICINE

## 2019-06-18 NOTE — ED NOTES
PATIENCE called at this time to set up transport to have pt brought back to [facility], will receive call back with transport information        Keely Cope RN  06/18/19 1923

## 2019-06-18 NOTE — ED PROVIDER NOTES
History  Chief Complaint   Patient presents with    Blood in Urine     coming from [facility], per staff pt had blood in urine, presents with no other complaints, pt poor historian with h/o dementia and [place]       Patient is 80-year-old male presents emergency department for evaluation regarding painless hematuria  Patient is a resident at [facility] and they noticed hematuria  Patient has not had any fevers  Patient is a poor historian  Prior to Admission Medications   Prescriptions Last Dose Informant Patient Reported? Taking?   aspirin (ASPIRIN LOW DOSE) 81 MG tablet  Self Yes Yes   Sig: Take 325 mg by mouth daily    donepezil (ARICEPT) 10 mg tablet  Self No Yes   Sig: Take 1 tablet (10 mg total) by mouth daily With food   furosemide (LASIX) 40 mg tablet  Self Yes Yes   Sig: Take 40 mg by mouth daily    guaiFENesin (ROBITUSSIN) 100 MG/5ML oral liquid   Yes Yes   Sig: Take 200 mg by mouth every 4 (four) hours as needed for cough   guaiFENesin 200 MG tablet   Yes Yes   Sig: Take 400 mg by mouth 2 (two) times a day   levothyroxine 50 mcg tablet  Self Yes Yes   Sig: Take 1 tablet by mouth daily   memantine (NAMENDA) 10 mg tablet  Self No Yes   Sig: Take 1 tablet (10 mg total) by mouth 2 (two) times a day   potassium chloride (MICRO-K) 10 MEQ CR capsule   Yes Yes   Sig: Take 10 mEq by mouth daily   simvastatin (ZOCOR) 40 mg tablet   Yes Yes   Sig: Take 40 mg by mouth daily at bedtime      Facility-Administered Medications: None       Past Medical History:   Diagnosis Date    Dementia     Heart attack ([address] )     Heart disease     Hyperlipidemia     Memory loss     Thyroid disease        Past Surgical History:   Procedure Laterality Date    CATARACT EXTRACTION      CORONARY ARTERY BYPASS GRAFT      quadruple    [place] ESOPHAGOGASTRODUODENOSCOPY TRANSORAL DIAGNOSTIC N/A 2/5/2019    Procedure: ESOPHAGOGASTRODUODENOSCOPY (EGD); Surgeon: Nhi Garcia DO;  Location[facility];   Service: Gastroenterology  TOTAL HIP ARTHROPLASTY         Family History   Problem Relation Age of Onset    No Known Problems Mother     No Known Problems Father     Fibromyalgia Daughter     Anemia Daughter     Hyperlipidemia Daughter      I have reviewed and agree with the history as documented  Social History     Tobacco Use    Smoking status: Never Smoker    Smokeless tobacco: Never Used   Substance Use Topics    Alcohol use: No    Drug use: No        Review of Systems   Unable to perform ROS: Dementia       Physical Exam  Physical Exam   Constitutional: He appears well-developed and well-nourished  Cardiovascular: Bradycardia present  Pulmonary/Chest: Effort normal and breath sounds normal    Abdominal: Soft  Bowel sounds are normal    Genitourinary:   Genitourinary Comments: Blood noted at the meatus  Neurological: He is disoriented  Skin: Skin is warm  Vitals reviewed  Vital Signs  ED Triage Vitals [06/17/19 2056]   Temperature Pulse Respirations Blood Pressure SpO2   97 7 °F (36 5 °C) (!) 43 16 117/57 97 %      Temp Source Heart Rate Source Patient Position - Orthostatic VS BP Location FiO2 (%)   Oral Monitor Sitting Right arm --      Pain Score       No Pain           Vitals:    06/17/19 2056   BP: 117/57   Pulse: (!) 43   Patient Position - Orthostatic VS: Sitting         Visual Acuity      ED Medications  Medications - No data to display    Diagnostic Studies  Results Reviewed     None                 No orders to display              Procedures  Procedures       ED Course           Identification of Seniors at Risk      Most Recent Value   (ISAR) Identification of Seniors at Risk   Before the illness or injury that brought you to the Emergency, did you need someone to help you on a regular basis? 1 Filed at: 06/17/2019 2056   In the last 24 hours, have you needed more help than usual?  0 Filed at: 06/17/2019 2056   Have you been hospitalized for one or more nights during the past 6 months?   0 Filed at: 06/17/2019 2056   In general, do you see well?  0 Filed at: 06/17/2019 2056   In general, do you have serious problems with your memory? 1 Filed at: 06/17/2019 2056   Do you take more than three different medications every day? 1 Filed at: 06/17/2019 2056   ISAR Score  3 Filed at: 06/17/2019 2056                          MDM  Number of Diagnoses or Management Options  Hematuria:   Diagnosis management comments: Patient is a 27-year-old male who presents emergency department for evaluation of hematuria  Patient was bladder scanned and did not have any urine in his bladder  Patient was given water re-evaluated with a bladder scan about 2 hours later and does have 80 mL of urine in his bladder  Patient did not have to urinate  Patient has no evidence of urinary retention  Patient stable for discharge  Risk of Complications, Morbidity, and/or Mortality  Presenting problems: low  Diagnostic procedures: low  Management options: low    Patient Progress  Patient progress: stable      Disposition  Final diagnoses:   Hematuria     Time reflects when diagnosis was documented in both MDM as applicable and the Disposition within this note     Time User Action Codes Description Comment    6/18/2019 12:15 AM [facility] Add [R31 9] Hematuria       ED Disposition     ED Disposition Condition Date/Time Comment    Discharge Good Tue Jun 18, 2019 0015 Harley Masrh discharge to home/self care              Follow-up Information     Follow up With Specialties Details Why Contact Info Additional Information    Angie Ovalle MD Internal Medicine   [address] [facility] Schedule an appointment as soon as possible for a visit   [address] [facility] [address] [phone] [facility] [address] [place] [address], [address] Patient's Medications   Discharge Prescriptions    No medications on file     No discharge procedures on file      ED Provider  Electronically Signed by           Bobby Guerrier PA-C  06/18/19 7551

## 2019-08-12 ENCOUNTER — TELEPHONE (OUTPATIENT)
Dept: NEUROLOGY | Facility: CLINIC | Age: 84
End: 2019-08-12

## 2019-08-12 NOTE — TELEPHONE ENCOUNTER
Received a call from patient's daughter Eliazar Garcia  She stated that Nhan Edgar has been attempting to contact us and hasn't heard from us  Informed her that I don't see any encounters other than a records request (unsure if this was forwarded to [facility])  Daughter aware I forwarded records request to [facility]   Advised she f/u with them and provided her their number  She provided another fax number: [phone] (included this fax number on cover letter to [facility])      If we need to speak Nhan Edgar claims dept: [phone]  [facility]

## 2019-08-16 ENCOUNTER — TELEPHONE (OUTPATIENT)
Dept: NEUROLOGY | Facility: CLINIC | Age: 84
End: 2019-08-16

## 2019-08-16 NOTE — TELEPHONE ENCOUNTER
Received records request from Irineo Segovia requesting records from July 1, 2018 to present on the patient    Mail Receiving [facility]wan  [address], [address]    Faxed to [phone] on 8/16/2019

## 2019-09-30 ENCOUNTER — TELEPHONE (OUTPATIENT)
Dept: NEUROLOGY | Facility: CLINIC | Age: 84
End: 2019-09-30

## 2019-09-30 NOTE — TELEPHONE ENCOUNTER
Pt's daughter and POA asking if we can fax cognitive assessment and care plan to [address]  I do not see FLORESITA on file  Jackie Pacheco will come to [facility] office tomorrow to sign a release   Once release is signed be fax requested records to attn: Jessica Phillips: [phone]

## 2019-10-02 NOTE — TELEPHONE ENCOUNTER
Patient's daughter, Mayur Thornton dropped off release of health form  Scanned into patient's chart  Fax number provided in last message is incorrect   I have placed a call to Mayur Thornton asking for the correct fax#

## 2019-10-30 ENCOUNTER — APPOINTMENT (EMERGENCY)
Dept: CT IMAGING | Facility: HOSPITAL | Age: 84
End: 2019-10-30
Payer: MEDICARE

## 2019-10-30 ENCOUNTER — APPOINTMENT (EMERGENCY)
Dept: RADIOLOGY | Facility: HOSPITAL | Age: 84
End: 2019-10-30
Payer: MEDICARE

## 2019-10-30 ENCOUNTER — HOSPITAL ENCOUNTER (EMERGENCY)
Facility: HOSPITAL | Age: 84
Discharge: HOME | End: 2019-10-30
Attending: EMERGENCY MEDICINE | Admitting: EMERGENCY MEDICINE
Payer: MEDICARE

## 2019-10-30 VITALS
RESPIRATION RATE: 19 BRPM | TEMPERATURE: 98.2 F | BODY MASS INDEX: 22.22 KG/M2 | DIASTOLIC BLOOD PRESSURE: 56 MMHG | HEART RATE: 65 BPM | WEIGHT: 146.16 LBS | OXYGEN SATURATION: 96 % | SYSTOLIC BLOOD PRESSURE: 111 MMHG
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
## 2019-10-30 DIAGNOSIS — J32.9 SINUSITIS: ICD-10-CM

## 2019-10-30 LAB
ALBUMIN SERPL BCP-MCNC: 2.7 G/DL (ref 3.5–5)
ALP SERPL-CCNC: 83 U/L (ref 46–116)
ALT SERPL W P-5'-P-CCNC: 26 U/L (ref 12–78)
ANION GAP SERPL CALCULATED.3IONS-SCNC: 8 MMOL/L (ref 4–13)
APTT PPP: 47 SECONDS (ref 23–37)
AST SERPL W P-5'-P-CCNC: 33 U/L (ref 5–45)
BACTERIA UR QL AUTO: ABNORMAL /HPF
BASOPHILS # BLD MANUAL: 0 THOUSAND/UL (ref 0–0.1)
BASOPHILS NFR MAR MANUAL: 0 % (ref 0–1)
BILIRUB SERPL-MCNC: 0.9 MG/DL (ref 0.2–1)
BILIRUB UR QL STRIP: ABNORMAL
BUN SERPL-MCNC: 16 MG/DL (ref 5–25)
CALCIUM SERPL-MCNC: 8.8 MG/DL (ref 8.3–10.1)
CHLORIDE SERPL-SCNC: 101 MMOL/L (ref 100–108)
CLARITY UR: ABNORMAL
CO2 SERPL-SCNC: 32 MMOL/L (ref 21–32)
COLOR UR: YELLOW
CREAT SERPL-MCNC: 0.89 MG/DL (ref 0.6–1.3)
EOSINOPHIL # BLD MANUAL: 0.09 THOUSAND/UL (ref 0–0.4)
EOSINOPHIL NFR BLD MANUAL: 1 % (ref 0–6)
ERYTHROCYTE [DISTWIDTH] IN BLOOD BY AUTOMATED COUNT: 14.1 % (ref 11.6–15.1)
GFR SERPL CREATININE-BSD FRML MDRD: 75 ML/MIN/1.73SQ M
GLUCOSE SERPL-MCNC: 100 MG/DL (ref 65–140)
GLUCOSE UR STRIP-MCNC: NEGATIVE MG/DL
HCT VFR BLD AUTO: 31.7 % (ref 36.5–49.3)
HGB BLD-MCNC: 10.1 G/DL (ref 12–17)
HGB UR QL STRIP.AUTO: NEGATIVE
HYALINE CASTS #/AREA URNS LPF: ABNORMAL /LPF
INR PPP: 1.27 (ref 0.84–1.19)
KETONES UR STRIP-MCNC: NEGATIVE MG/DL
LEUKOCYTE ESTERASE UR QL STRIP: NEGATIVE
LYMPHOCYTES # BLD AUTO: 1.46 THOUSAND/UL (ref 0.6–4.47)
LYMPHOCYTES # BLD AUTO: 17 % (ref 14–44)
MCH RBC QN AUTO: 36.1 PG (ref 26.8–34.3)
MCHC RBC AUTO-ENTMCNC: 31.9 G/DL (ref 31.4–37.4)
MCV RBC AUTO: 113 FL (ref 82–98)
METAMYELOCYTES NFR BLD MANUAL: 2 % (ref 0–1)
MONOCYTES # BLD AUTO: 0.26 THOUSAND/UL (ref 0–1.22)
MONOCYTES NFR BLD: 3 % (ref 4–12)
MYELOCYTES NFR BLD MANUAL: 1 % (ref 0–1)
NEUTROPHILS # BLD MANUAL: 6.51 THOUSAND/UL (ref 1.85–7.62)
NEUTS BAND NFR BLD MANUAL: 14 % (ref 0–8)
NEUTS SEG NFR BLD AUTO: 62 % (ref 43–75)
NITRITE UR QL STRIP: NEGATIVE
NON-SQ EPI CELLS URNS QL MICRO: ABNORMAL /HPF
NRBC BLD AUTO-RTO: 0 /100 WBCS
PH UR STRIP.AUTO: 6 [PH]
PLATELET # BLD AUTO: 129 THOUSANDS/UL (ref 149–390)
PLATELET BLD QL SMEAR: ABNORMAL
PMV BLD AUTO: 10.8 FL (ref 8.9–12.7)
POTASSIUM SERPL-SCNC: 3.3 MMOL/L (ref 3.5–5.3)
PROT SERPL-MCNC: 7.8 G/DL (ref 6.4–8.2)
PROT UR STRIP-MCNC: ABNORMAL MG/DL
PROTHROMBIN TIME: 16 SECONDS (ref 11.6–14.5)
RBC # BLD AUTO: 2.8 MILLION/UL (ref 3.88–5.62)
RBC #/AREA URNS AUTO: ABNORMAL /HPF
SODIUM SERPL-SCNC: 141 MMOL/L (ref 136–145)
SP GR UR STRIP.AUTO: 1.01 (ref 1–1.03)
TOTAL CELLS COUNTED SPEC: 100
TROPONIN I SERPL-MCNC: <0.02 NG/ML
UROBILINOGEN UR QL STRIP.AUTO: 4 E.U./DL
WBC # BLD AUTO: 8.57 THOUSAND/UL (ref 4.31–10.16)
WBC #/AREA URNS AUTO: ABNORMAL /HPF

## 2019-10-30 PROCEDURE — 85730 THROMBOPLASTIN TIME PARTIAL: CPT | Performed by: EMERGENCY MEDICINE

## 2019-10-30 PROCEDURE — 85610 PROTHROMBIN TIME: CPT | Performed by: EMERGENCY MEDICINE

## 2019-10-30 PROCEDURE — 74177 CT ABD & PELVIS W/CONTRAST: CPT

## 2019-10-30 PROCEDURE — 93005 ELECTROCARDIOGRAM TRACING: CPT

## 2019-10-30 PROCEDURE — 81001 URINALYSIS AUTO W/SCOPE: CPT | Performed by: EMERGENCY MEDICINE

## 2019-10-30 PROCEDURE — 85007 BL SMEAR W/DIFF WBC COUNT: CPT | Performed by: EMERGENCY MEDICINE

## 2019-10-30 PROCEDURE — 85027 COMPLETE CBC AUTOMATED: CPT | Performed by: EMERGENCY MEDICINE

## 2019-10-30 PROCEDURE — 36415 COLL VENOUS BLD VENIPUNCTURE: CPT | Performed by: EMERGENCY MEDICINE

## 2019-10-30 PROCEDURE — 70450 CT HEAD/BRAIN W/O DYE: CPT

## 2019-10-30 PROCEDURE — 72125 CT NECK SPINE W/O DYE: CPT

## 2019-10-30 PROCEDURE — 71260 CT THORAX DX C+: CPT

## 2019-10-30 PROCEDURE — 80053 COMPREHEN METABOLIC PANEL: CPT | Performed by: EMERGENCY MEDICINE

## 2019-10-30 PROCEDURE — 84484 ASSAY OF TROPONIN QUANT: CPT | Performed by: EMERGENCY MEDICINE

## 2019-10-30 PROCEDURE — 99285 EMERGENCY DEPT VISIT HI MDM: CPT

## 2019-10-30 PROCEDURE — 73564 X-RAY EXAM KNEE 4 OR MORE: CPT

## 2019-10-30 PROCEDURE — 99285 EMERGENCY DEPT VISIT HI MDM: CPT | Performed by: EMERGENCY MEDICINE

## 2019-10-30 RX ORDER — AMOXICILLIN AND CLAVULANATE POTASSIUM 875; 125 MG/1; MG/1
1 TABLET, FILM COATED ORAL EVERY 12 HOURS
Qty: 14 TABLET | Refills: 0 | Status: SHIPPED | OUTPATIENT
Start: 2019-10-30 | End: 2019-11-06

## 2019-10-30 RX ORDER — POTASSIUM CHLORIDE 20MEQ/15ML
20 LIQUID (ML) ORAL ONCE
Status: COMPLETED | OUTPATIENT
Start: 2019-10-30 | End: 2019-10-30

## 2019-10-30 RX ADMIN — POTASSIUM CHLORIDE 20 MEQ: 20 SOLUTION ORAL at 06:46

## 2019-10-30 RX ADMIN — IOHEXOL 100 ML: 350 INJECTION, SOLUTION INTRAVENOUS at 06:18

## 2019-10-30 NOTE — TRAUMA DOCUMENTATION
P[facility]s contacted to transport pt back to [facility]  Per Delmi Feng, transport via [facility] between [phone] today  [facility] contacted, made aware of transport time, report given
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
## 2019-10-30 NOTE — TRAUMA DOCUMENTATION
Pt informed of the need for a urine sample and advised to alert staff when he has the urge to urinate

## 2019-10-31 LAB
ATRIAL RATE: 159 BPM
QRS AXIS: -20 DEGREES
QRSD INTERVAL: 112 MS
QT INTERVAL: 438 MS
QTC INTERVAL: 429 MS
T WAVE AXIS: 126 DEGREES
VENTRICULAR RATE: 58 BPM

## 2019-10-31 PROCEDURE — 93010 ELECTROCARDIOGRAM REPORT: CPT | Performed by: INTERNAL MEDICINE

## 2020-02-07 ENCOUNTER — APPOINTMENT (EMERGENCY)
Dept: RADIOLOGY | Facility: HOSPITAL | Age: 85
End: 2020-02-07
Payer: MEDICARE

## 2020-02-07 ENCOUNTER — HOSPITAL ENCOUNTER (EMERGENCY)
Facility: HOSPITAL | Age: 85
Discharge: HOME | End: 2020-02-07
Attending: EMERGENCY MEDICINE | Admitting: EMERGENCY MEDICINE
Payer: MEDICARE

## 2020-02-07 VITALS
RESPIRATION RATE: 17 BRPM | BODY MASS INDEX: 21.42 KG/M2 | SYSTOLIC BLOOD PRESSURE: 112 MMHG | DIASTOLIC BLOOD PRESSURE: 69 MMHG | OXYGEN SATURATION: 98 % | WEIGHT: 140.87 LBS | TEMPERATURE: 97.5 F | HEART RATE: 50 BPM

## 2020-02-07 DIAGNOSIS — M70.51 PATELLAR BURSITIS OF RIGHT KNEE: Primary | ICD-10-CM

## 2020-02-07 DIAGNOSIS — M70.41 PREPATELLAR BURSITIS OF RIGHT KNEE: ICD-10-CM

## 2020-02-07 LAB
ALBUMIN SERPL BCP-MCNC: 2.6 G/DL (ref 3.5–5)
ALP SERPL-CCNC: 70 U/L (ref 46–116)
ALT SERPL W P-5'-P-CCNC: 16 U/L (ref 12–78)
ANION GAP SERPL CALCULATED.3IONS-SCNC: 5 MMOL/L (ref 4–13)
AST SERPL W P-5'-P-CCNC: 23 U/L (ref 5–45)
BASOPHILS # BLD MANUAL: 0 THOUSAND/UL (ref 0–0.1)
BASOPHILS NFR MAR MANUAL: 0 % (ref 0–1)
BILIRUB SERPL-MCNC: 0.4 MG/DL (ref 0.2–1)
BUN SERPL-MCNC: 19 MG/DL (ref 5–25)
CALCIUM SERPL-MCNC: 8.7 MG/DL (ref 8.3–10.1)
CHLORIDE SERPL-SCNC: 102 MMOL/L (ref 100–108)
CO2 SERPL-SCNC: 32 MMOL/L (ref 21–32)
CREAT SERPL-MCNC: 0.83 MG/DL (ref 0.6–1.3)
EOSINOPHIL # BLD MANUAL: 0 THOUSAND/UL (ref 0–0.4)
EOSINOPHIL NFR BLD MANUAL: 0 % (ref 0–6)
ERYTHROCYTE [DISTWIDTH] IN BLOOD BY AUTOMATED COUNT: 13.7 % (ref 11.6–15.1)
GFR SERPL CREATININE-BSD FRML MDRD: 77 ML/MIN/1.73SQ M
GLUCOSE SERPL-MCNC: 90 MG/DL (ref 65–140)
HCT VFR BLD AUTO: 31.4 % (ref 36.5–49.3)
HGB BLD-MCNC: 9.9 G/DL (ref 12–17)
LYMPHOCYTES # BLD AUTO: 1.32 THOUSAND/UL (ref 0.6–4.47)
LYMPHOCYTES # BLD AUTO: 39 % (ref 14–44)
MCH RBC QN AUTO: 36.1 PG (ref 26.8–34.3)
MCHC RBC AUTO-ENTMCNC: 31.5 G/DL (ref 31.4–37.4)
MCV RBC AUTO: 115 FL (ref 82–98)
MONOCYTES # BLD AUTO: 1.08 THOUSAND/UL (ref 0–1.22)
MONOCYTES NFR BLD: 32 % (ref 4–12)
MYELOCYTES NFR BLD MANUAL: 1 % (ref 0–1)
NEUTROPHILS # BLD MANUAL: 0.88 THOUSAND/UL (ref 1.85–7.62)
NEUTS BAND NFR BLD MANUAL: 4 % (ref 0–8)
NEUTS SEG NFR BLD AUTO: 22 % (ref 43–75)
NRBC BLD AUTO-RTO: 0 /100 WBCS
PLATELET # BLD AUTO: 111 THOUSANDS/UL (ref 149–390)
PLATELET BLD QL SMEAR: ABNORMAL
PMV BLD AUTO: 11 FL (ref 8.9–12.7)
POTASSIUM SERPL-SCNC: 3.8 MMOL/L (ref 3.5–5.3)
PROT SERPL-MCNC: 7 G/DL (ref 6.4–8.2)
RBC # BLD AUTO: 2.74 MILLION/UL (ref 3.88–5.62)
SODIUM SERPL-SCNC: 139 MMOL/L (ref 136–145)
TOTAL CELLS COUNTED SPEC: 100
VARIANT LYMPHS # BLD AUTO: 2 %
WBC # BLD AUTO: 3.38 THOUSAND/UL (ref 4.31–10.16)

## 2020-02-07 PROCEDURE — 80053 COMPREHEN METABOLIC PANEL: CPT | Performed by: NURSE PRACTITIONER

## 2020-02-07 PROCEDURE — 85007 BL SMEAR W/DIFF WBC COUNT: CPT | Performed by: NURSE PRACTITIONER

## 2020-02-07 PROCEDURE — 36415 COLL VENOUS BLD VENIPUNCTURE: CPT | Performed by: NURSE PRACTITIONER

## 2020-02-07 PROCEDURE — 99284 EMERGENCY DEPT VISIT MOD MDM: CPT

## 2020-02-07 PROCEDURE — 99284 EMERGENCY DEPT VISIT MOD MDM: CPT | Performed by: NURSE PRACTITIONER

## 2020-02-07 PROCEDURE — 73564 X-RAY EXAM KNEE 4 OR MORE: CPT

## 2020-02-07 PROCEDURE — 85027 COMPLETE CBC AUTOMATED: CPT | Performed by: NURSE PRACTITIONER

## 2020-02-07 RX ORDER — CEPHALEXIN 250 MG/1
500 CAPSULE ORAL ONCE
Status: COMPLETED | OUTPATIENT
Start: 2020-02-07 | End: 2020-02-07

## 2020-02-07 RX ORDER — CEPHALEXIN 500 MG/1
500 CAPSULE ORAL EVERY 8 HOURS SCHEDULED
Qty: 30 CAPSULE | Refills: 0 | Status: SHIPPED | OUTPATIENT
Start: 2020-02-07 | End: 2020-02-17

## 2020-02-07 RX ORDER — ACETAMINOPHEN 500 MG
500 TABLET ORAL EVERY 6 HOURS PRN
Qty: 20 TABLET | Refills: 0 | Status: SHIPPED | OUTPATIENT
Start: 2020-02-07 | End: 2020-02-12

## 2020-02-07 RX ORDER — ACETAMINOPHEN 325 MG/1
650 TABLET ORAL ONCE
Status: COMPLETED | OUTPATIENT
Start: 2020-02-07 | End: 2020-02-07

## 2020-02-07 RX ADMIN — CEPHALEXIN 500 MG: 250 CAPSULE ORAL at 15:25

## 2020-02-07 RX ADMIN — ACETAMINOPHEN 650 MG: 325 TABLET, FILM COATED ORAL at 15:25

## 2020-02-07 NOTE — ED NOTES
Mariia called to setup transport back to [facility] Pappas RN  02/07/20 9764

## 2020-02-07 NOTE — ED PROVIDER NOTES
History  Chief Complaint   Patient presents with    Knee Pain     pt c/o R knee pain & swelling, unknown fall     This is a 80-year-old male patient presents here with a chief complaint of right knee pain  [facility] and [facility]  He was found last night during a routine skin check and then noted again this morning and so hence the a referral to the emergency department  No reported fevers or chills  The erythema seems isolated to the anterior portion of the knee over the patella and surrounding tissue  He does have full range of motion of the knee joint  This is perhaps a prepatellar bursitis  He does have a history of underlying dementia so unclear whether he may have ulnar not  Knee Pain   Associated symptoms: no back pain, no fatigue and no fever        Prior to Admission Medications   Prescriptions Last Dose Informant Patient Reported?  Taking?   aspirin (ASPIRIN LOW DOSE) 81 MG tablet  Self Yes No   Sig: Take 325 mg by mouth daily    donepezil (ARICEPT) 10 mg tablet  Self No No   Sig: Take 1 tablet (10 mg total) by mouth daily With food   furosemide (LASIX) 40 mg tablet  Self Yes No   Sig: Take 40 mg by mouth daily    guaiFENesin (ROBITUSSIN) 100 MG/5ML oral liquid   Yes No   Sig: Take 200 mg by mouth every 4 (four) hours as needed for cough   guaiFENesin 200 MG tablet   Yes No   Sig: Take 400 mg by mouth 2 (two) times a day   levothyroxine 50 mcg tablet  Self Yes No   Sig: Take 1 tablet by mouth daily   memantine (NAMENDA) 10 mg tablet  Self No No   Sig: Take 1 tablet (10 mg total) by mouth 2 (two) times a day   potassium chloride (MICRO-K) 10 MEQ CR capsule   Yes No   Sig: Take 10 mEq by mouth daily   simvastatin (ZOCOR) 40 mg tablet   Yes No   Sig: Take 40 mg by mouth daily at bedtime      Facility-Administered Medications: None       Past Medical History:   Diagnosis Date    [facility]ementia [facility]     Heart attack ([facility]     Heart disease  Hyperlipidemia     Memory loss     Thyroid disease        Past Surgical History:   Procedure Laterality Date    CATARACT EXTRACTION      CORONARY ARTERY BYPASS GRAFT      quadruple    IN ESOPHAGOGASTRODUODENOSCOPY TRANSORAL DIAGNOSTIC N/A 2/5/2019    Procedure: ESOPHAGOGASTRODUODENOSCOPY (EGD); Surgeon: Jason Kan DO;  Location: [facility]; Service: Gastroenterology    TOTAL HIP ARTHROPLASTY         Family History   Problem Relation Age of Onset    No Known Problems Mother     No Known Problems Father     Fibromyalgia Daughter     Anemia Daughter     Hyperlipidemia Daughter      I have reviewed and agree with the history as documented  Social History     Tobacco Use    Smoking status: Never Smoker    Smokeless tobacco: Never Used   Substance Use Topics    Alcohol use: No    Drug use: No        Review of Systems   Constitutional: Negative for diaphoresis, fatigue and fever  HENT: Negative for congestion, ear pain, nosebleeds and sore throat  Eyes: Negative for photophobia, pain, discharge and visual disturbance  Respiratory: Negative for cough, choking, chest tightness, shortness of breath and wheezing  Cardiovascular: Negative for chest pain and palpitations  Gastrointestinal: Negative for abdominal distention, abdominal pain, diarrhea and vomiting  Genitourinary: Negative for dysuria, flank pain and frequency  Musculoskeletal: Positive for joint swelling  Negative for back pain and gait problem  Skin: Positive for rash  Negative for color change  Neurological: Negative for dizziness, syncope and headaches  Psychiatric/Behavioral: Negative for behavioral problems and confusion  The patient is not nervous/anxious  All other systems reviewed and are negative  Physical Exam  Physical Exam   Constitutional: He is oriented to person, place, and time  He appears well-developed and well-nourished  HENT:   Head: Normocephalic and atraumatic     Eyes: Pupils are equal, round, and reactive to light  Neck: Normal range of motion  Neck supple  Cardiovascular: Normal rate, regular rhythm, normal heart sounds and normal pulses  PMI is not displaced  Pulmonary/Chest: Effort normal and breath sounds normal  No respiratory distress  Abdominal: Soft  He exhibits no distension  There is no guarding  Musculoskeletal: Normal range of motion  Right knee: He exhibits swelling, effusion and erythema  Tenderness found  Patellar tendon tenderness noted  Lymphadenopathy:     He has no cervical adenopathy  Neurological: He is alert and oriented to person, place, and time  Skin: Skin is warm and dry  No rash noted  He is not diaphoretic  No pallor  Psychiatric: He has a normal mood and affect  Vitals reviewed        Vital Signs  ED Triage Vitals   Temperature Pulse Respirations Blood Pressure SpO2   02/07/20 1146 02/07/20 1146 02/07/20 1146 02/07/20 1146 02/07/20 1146   97 5 °F (36 4 °C) (!) 46 17 134/88 99 %      Temp Source Heart Rate Source Patient Position - Orthostatic VS BP Location FiO2 (%)   02/07/20 1146 -- 02/07/20 1308 02/07/20 1308 --   Oral  Sitting Left arm       Pain Score       --                  Vitals:    02/07/20 1146 02/07/20 1308   BP: 134/88 112/69   Pulse: (!) 46 (!) 50   Patient Position - Orthostatic VS:  Sitting         Visual Acuity      ED Medications  Medications   cephalexin (KEFLEX) capsule 500 mg (has no administration in time range)   acetaminophen (TYLENOL) tablet 650 mg (has no administration in time range)       Diagnostic Studies  Results Reviewed     Procedure Component Value Units Date/Time    CBC and differential [[id number]]  (Abnormal) Collected:  02/07/20 1308    Lab Status:  Final result Specimen:  Blood from Arm, Right Updated:  02/07/20 1354     WBC 3 38 Thousand/uL      RBC 2 74 Million/uL      Hemoglobin 9 9 g/dL      Hematocrit 31 4 %       fL      MCH 36 1 pg      MCHC 31 5 g/dL      RDW 13 7 %      MPV 11 0 fL Platelets 617 Thousands/uL      nRBC 0 /100 WBCs     Comprehensive metabolic panel [[id number]]  (Abnormal) Collected:  02/07/20 1308    Lab Status:  Final result Specimen:  Blood from Arm, Right Updated:  02/07/20 1336     Sodium 139 mmol/L      Potassium 3 8 mmol/L      Chloride 102 mmol/L      CO2 32 mmol/L      ANION GAP 5 mmol/L      BUN 19 mg/dL      Creatinine 0 83 mg/dL      Glucose 90 mg/dL      Calcium 8 7 mg/dL      AST 23 U/L      ALT 16 U/L      Alkaline Phosphatase 70 U/L      Total Protein 7 0 g/dL      Albumin 2 6 g/dL      Total Bilirubin 0 40 mg/dL      eGFR 77 ml/min/1 73sq m     Narrative:       [place] guidelines for Chronic Kidney Disease (CKD):     Stage 1 with normal or high GFR (GFR > 90 mL/min/1 73 square meters)    Stage 2 Mild CKD (GFR = 60-89 mL/min/1 73 square meters)    Stage 3A Moderate CKD (GFR = 45-59 mL/min/1 73 square meters)    Stage 3B Moderate CKD (GFR = 30-44 mL/min/1 73 square meters)    Stage 4 Severe CKD (GFR = 15-29 mL/min/1 73 square meters)    Stage 5 End Stage CKD (GFR <15 mL/min/1 73 square meters)  Note: GFR calculation is accurate only with a steady state creatinine                 XR knee 4+ vw right injury    (Results Pending)              Procedures  Procedures         ED Course                               MDM  Number of Diagnoses or Management Options  Patellar bursitis of right knee: new and requires workup  Prepatellar bursitis of right knee: new and requires workup  Diagnosis management comments: Degenerative changes on x-ray but no evidence of fracture  We did ambulate the patient with a walker and he was able to walk with minimal difficulty  Aside from difficulties associated with age  Otherwise he is mostly wheelchair bound anyway  I feel that he most likely had an unwitnessed injury to the right patella causing a prepatellar bursitis    The skin is erythematous which would be associated with this but could also be associated with a early cellulitis so will cover with antibiotics anyway although he has no fevers and I believe that this is traumatic in nature  Discussed the plan with the orthopedic PA who is on board with having the patient follow-up in about a week for re-evaluation  Amount and/or Complexity of Data Reviewed  Clinical lab tests: reviewed and ordered  Tests in the radiology section of CPT®: reviewed and ordered  Independent visualization of images, tracings, or specimens: yes    Patient Progress  Patient progress: stable        Disposition  Final diagnoses:   Patellar bursitis of right knee   Prepatellar bursitis of right knee     Time reflects when diagnosis was documented in both MDM as applicable and the Disposition within this note     Time User Action Codes Description Comment    2/7/2020  2:10 PM Lei Frederick Add [M70 51] Patellar bursitis of right knee     2/7/2020  2:10 PM Lei Stephanie Add [M70 41] Prepatellar bursitis of right knee       ED Disposition     ED Disposition Condition Date/Time Comment    Discharge Stable Fri Feb 7, 2020  2:08 PM Bradford Trinity discharge to home/self care              Follow-up Information     Follow up With Specialties Details Why Contact Info Additional [phone] [facility] Schedule an appointment as soon as possible for a visit in 5 days For Recheck [address] [facility] [address] [phone]  [address] [place] [address], [address], [place], [place], [address] [place] [address]          Patient's Medications   Discharge Prescriptions    ACETAMINOPHEN (TYLENOL) 500 MG TABLET    Take 1 tablet (500 mg total) by mouth every 6 (six) hours as needed for mild pain or fever for up to 5 days       Start Date: 2/7/2020  End Date: 2/12/2020       Order Dose: 500 mg       Quantity: 20 tablet    Refills: 0    CEPHALEXIN (KEFLEX) 500 MG CAPSULE    Take 1 capsule (500 mg total) by mouth every 8 (eight) hours for 10 days       Start Date: 2/7/2020  End Date: 2/17/2020       Order Dose: 500 mg       Quantity: 30 capsule    Refills: 0     No discharge procedures on file      ED Provider  Electronically Signed by           Shante Lawrence  02/07/20 0458

## 2020-02-18 ENCOUNTER — TELEPHONE (OUTPATIENT)
Dept: NEUROLOGY | Facility: CLINIC | Age: 85
End: 2020-02-18

## 2020-02-18 NOTE — TELEPHONE ENCOUNTER
2/18 [facility] ?? NEW 2ND INS - REQUESTED CALL BACK    PT'S WIFE RETURNED CALL STATING [employer] IS STILL ACTIVE  I EXPLAINED TO HER THAT I WAS ON THE [employer] WEBSITE AND IT INDICATES INSURANCE INACTIVE AS OF 12/1/19  WIFE CALLING INSURANCE COMPANY    2/19 PT'S DAUGHTER CALLED STATING [employer] SHOULD BE OKAY NOW   SHE INQUIRED IF THIS WAS A POLICY TO CALL PT RE: INSURANCES  I EXPLAINED IF THE INSURANCE IS INACTIVE WE DO CALL AND ALSO IF PATIENT WOULD REQUIRE AN INSURANCE REFERRAL WE WOULD OBTAIN FOR THEM  I INFORMED HER THAT I CHECKED INSURANCE WEBSITE ON [employer] AND [employer] IS NOW SHOWING ACTIVE

## 2020-09-09 ENCOUNTER — HOSPITAL ENCOUNTER (INPATIENT)
Facility: HOSPITAL | Age: 85
LOS: 7 days | Discharge: NURSING, REHAB OR LONG-TERM CARE | DRG: 884 | End: 2020-09-16
Attending: EMERGENCY MEDICINE | Admitting: STUDENT IN AN ORGANIZED HEALTH CARE EDUCATION/TRAINING PROGRAM
Payer: MEDICARE

## 2020-09-09 DIAGNOSIS — Z72.89 INAPPROPRIATE SEXUAL BEHAVIOR: Primary | ICD-10-CM

## 2020-09-09 DIAGNOSIS — M54.2 NECK PAIN: ICD-10-CM

## 2020-09-09 DIAGNOSIS — F02.81 LATE ONSET ALZHEIMER'S DISEASE WITH BEHAVIORAL DISTURBANCE (HCC): ICD-10-CM

## 2020-09-09 DIAGNOSIS — F03.90 DEMENTIA (HCC): ICD-10-CM

## 2020-09-09 DIAGNOSIS — G30.1 LATE ONSET ALZHEIMER'S DISEASE WITH BEHAVIORAL DISTURBANCE (HCC): ICD-10-CM

## 2020-09-09 PROBLEM — E78.5 HYPERLIPIDEMIA: Status: ACTIVE | Noted: 2020-09-09

## 2020-09-09 LAB
ALBUMIN SERPL BCP-MCNC: 3.1 G/DL (ref 3.5–5)
ALP SERPL-CCNC: 66 U/L (ref 46–116)
ALT SERPL W P-5'-P-CCNC: 19 U/L (ref 12–78)
ANION GAP SERPL CALCULATED.3IONS-SCNC: 8 MMOL/L (ref 4–13)
AST SERPL W P-5'-P-CCNC: 24 U/L (ref 5–45)
BACTERIA UR QL AUTO: ABNORMAL /HPF
BASOPHILS # BLD MANUAL: 0 THOUSAND/UL (ref 0–0.1)
BASOPHILS NFR MAR MANUAL: 0 % (ref 0–1)
BILIRUB SERPL-MCNC: 0.6 MG/DL (ref 0.2–1)
BILIRUB UR QL STRIP: NEGATIVE
BUN SERPL-MCNC: 28 MG/DL (ref 5–25)
CALCIUM SERPL-MCNC: 8.8 MG/DL (ref 8.3–10.1)
CHLORIDE SERPL-SCNC: 107 MMOL/L (ref 100–108)
CLARITY UR: CLEAR
CO2 SERPL-SCNC: 29 MMOL/L (ref 21–32)
COLOR UR: YELLOW
CREAT SERPL-MCNC: 0.87 MG/DL (ref 0.6–1.3)
EOSINOPHIL # BLD MANUAL: 0.04 THOUSAND/UL (ref 0–0.4)
EOSINOPHIL NFR BLD MANUAL: 1 % (ref 0–6)
ERYTHROCYTE [DISTWIDTH] IN BLOOD BY AUTOMATED COUNT: 14.8 % (ref 11.6–15.1)
ETHANOL SERPL-MCNC: <3 MG/DL (ref 0–3)
GFR SERPL CREATININE-BSD FRML MDRD: 75 ML/MIN/1.73SQ M
GLUCOSE SERPL-MCNC: 90 MG/DL (ref 65–140)
GLUCOSE UR STRIP-MCNC: NEGATIVE MG/DL
HCT VFR BLD AUTO: 29.4 % (ref 36.5–49.3)
HGB BLD-MCNC: 8.9 G/DL (ref 12–17)
HGB UR QL STRIP.AUTO: ABNORMAL
HYALINE CASTS #/AREA URNS LPF: ABNORMAL /LPF
KETONES UR STRIP-MCNC: NEGATIVE MG/DL
LEUKOCYTE ESTERASE UR QL STRIP: NEGATIVE
LYMPHOCYTES # BLD AUTO: 1.24 THOUSAND/UL (ref 0.6–4.47)
LYMPHOCYTES # BLD AUTO: 30 % (ref 14–44)
MACROCYTES BLD QL AUTO: PRESENT
MCH RBC QN AUTO: 34.9 PG (ref 26.8–34.3)
MCHC RBC AUTO-ENTMCNC: 30.3 G/DL (ref 31.4–37.4)
MCV RBC AUTO: 115 FL (ref 82–98)
METAMYELOCYTES NFR BLD MANUAL: 1 % (ref 0–1)
MONOCYTES # BLD AUTO: 0.74 THOUSAND/UL (ref 0–1.22)
MONOCYTES NFR BLD: 18 % (ref 4–12)
MYELOCYTES NFR BLD MANUAL: 1 % (ref 0–1)
NEUTROPHILS # BLD MANUAL: 2.02 THOUSAND/UL (ref 1.85–7.62)
NEUTS BAND NFR BLD MANUAL: 4 % (ref 0–8)
NEUTS SEG NFR BLD AUTO: 45 % (ref 43–75)
NITRITE UR QL STRIP: NEGATIVE
NON-SQ EPI CELLS URNS QL MICRO: ABNORMAL /HPF
NRBC BLD AUTO-RTO: 0 /100 WBCS
PH UR STRIP.AUTO: 5.5 [PH]
PLATELET # BLD AUTO: 102 THOUSANDS/UL (ref 149–390)
PLATELET BLD QL SMEAR: ABNORMAL
PMV BLD AUTO: 11.1 FL (ref 8.9–12.7)
POTASSIUM SERPL-SCNC: 4.4 MMOL/L (ref 3.5–5.3)
PROT SERPL-MCNC: 7.5 G/DL (ref 6.4–8.2)
PROT UR STRIP-MCNC: NEGATIVE MG/DL
RBC # BLD AUTO: 2.55 MILLION/UL (ref 3.88–5.62)
RBC #/AREA URNS AUTO: ABNORMAL /HPF
SARS-COV-2 RNA RESP QL NAA+PROBE: NEGATIVE
SODIUM SERPL-SCNC: 144 MMOL/L (ref 136–145)
SP GR UR STRIP.AUTO: 1.02 (ref 1–1.03)
TOTAL CELLS COUNTED SPEC: 100
TSH SERPL DL<=0.05 MIU/L-ACNC: 7.3 UIU/ML (ref 0.36–3.74)
UROBILINOGEN UR QL STRIP.AUTO: 0.2 E.U./DL
WBC # BLD AUTO: 4.12 THOUSAND/UL (ref 4.31–10.16)
WBC #/AREA URNS AUTO: ABNORMAL /HPF

## 2020-09-09 PROCEDURE — 83540 ASSAY OF IRON: CPT | Performed by: NURSE PRACTITIONER

## 2020-09-09 PROCEDURE — 82728 ASSAY OF FERRITIN: CPT | Performed by: NURSE PRACTITIONER

## 2020-09-09 PROCEDURE — 87635 SARS-COV-2 COVID-19 AMP PRB: CPT | Performed by: EMERGENCY MEDICINE

## 2020-09-09 PROCEDURE — 85007 BL SMEAR W/DIFF WBC COUNT: CPT | Performed by: EMERGENCY MEDICINE

## 2020-09-09 PROCEDURE — 99285 EMERGENCY DEPT VISIT HI MDM: CPT

## 2020-09-09 PROCEDURE — 82746 ASSAY OF FOLIC ACID SERUM: CPT | Performed by: NURSE PRACTITIONER

## 2020-09-09 PROCEDURE — 83550 IRON BINDING TEST: CPT | Performed by: NURSE PRACTITIONER

## 2020-09-09 PROCEDURE — 99285 EMERGENCY DEPT VISIT HI MDM: CPT | Performed by: EMERGENCY MEDICINE

## 2020-09-09 PROCEDURE — 85027 COMPLETE CBC AUTOMATED: CPT | Performed by: EMERGENCY MEDICINE

## 2020-09-09 PROCEDURE — 93005 ELECTROCARDIOGRAM TRACING: CPT

## 2020-09-09 PROCEDURE — 80053 COMPREHEN METABOLIC PANEL: CPT | Performed by: EMERGENCY MEDICINE

## 2020-09-09 PROCEDURE — 36415 COLL VENOUS BLD VENIPUNCTURE: CPT | Performed by: EMERGENCY MEDICINE

## 2020-09-09 PROCEDURE — 80320 DRUG SCREEN QUANTALCOHOLS: CPT | Performed by: EMERGENCY MEDICINE

## 2020-09-09 PROCEDURE — 81001 URINALYSIS AUTO W/SCOPE: CPT | Performed by: EMERGENCY MEDICINE

## 2020-09-09 PROCEDURE — 99223 1ST HOSP IP/OBS HIGH 75: CPT | Performed by: STUDENT IN AN ORGANIZED HEALTH CARE EDUCATION/TRAINING PROGRAM

## 2020-09-09 PROCEDURE — 82607 VITAMIN B-12: CPT | Performed by: NURSE PRACTITIONER

## 2020-09-09 PROCEDURE — 84443 ASSAY THYROID STIM HORMONE: CPT | Performed by: EMERGENCY MEDICINE

## 2020-09-09 RX ORDER — LEVOTHYROXINE SODIUM 0.05 MG/1
50 TABLET ORAL DAILY
Status: DISCONTINUED | OUTPATIENT
Start: 2020-09-09 | End: 2020-09-16 | Stop reason: HOSPADM

## 2020-09-09 RX ORDER — HEPARIN SODIUM 5000 [USP'U]/ML
5000 INJECTION, SOLUTION INTRAVENOUS; SUBCUTANEOUS EVERY 8 HOURS SCHEDULED
Status: DISCONTINUED | OUTPATIENT
Start: 2020-09-09 | End: 2020-09-16 | Stop reason: HOSPADM

## 2020-09-09 RX ORDER — FUROSEMIDE 40 MG/1
40 TABLET ORAL DAILY
Status: DISCONTINUED | OUTPATIENT
Start: 2020-09-09 | End: 2020-09-10

## 2020-09-09 RX ORDER — DONEPEZIL HYDROCHLORIDE 5 MG/1
10 TABLET, FILM COATED ORAL DAILY
Status: DISCONTINUED | OUTPATIENT
Start: 2020-09-09 | End: 2020-09-16 | Stop reason: HOSPADM

## 2020-09-09 RX ORDER — MEMANTINE HYDROCHLORIDE 10 MG/1
10 TABLET ORAL 2 TIMES DAILY
Status: DISCONTINUED | OUTPATIENT
Start: 2020-09-09 | End: 2020-09-16 | Stop reason: HOSPADM

## 2020-09-09 RX ORDER — ASPIRIN 81 MG/1
81 TABLET, CHEWABLE ORAL DAILY
Status: DISCONTINUED | OUTPATIENT
Start: 2020-09-09 | End: 2020-09-16 | Stop reason: HOSPADM

## 2020-09-09 RX ORDER — HALOPERIDOL 5 MG/ML
5 INJECTION INTRAMUSCULAR ONCE
Status: COMPLETED | OUTPATIENT
Start: 2020-09-09 | End: 2020-09-09

## 2020-09-09 RX ORDER — SENNOSIDES 8.6 MG
1 TABLET ORAL
Status: DISCONTINUED | OUTPATIENT
Start: 2020-09-09 | End: 2020-09-16 | Stop reason: HOSPADM

## 2020-09-09 RX ORDER — PRAVASTATIN SODIUM 80 MG/1
80 TABLET ORAL
Status: DISCONTINUED | OUTPATIENT
Start: 2020-09-09 | End: 2020-09-16 | Stop reason: HOSPADM

## 2020-09-09 RX ADMIN — HEPARIN SODIUM 5000 UNITS: 5000 INJECTION INTRAVENOUS; SUBCUTANEOUS at 22:24

## 2020-09-09 RX ADMIN — HALOPERIDOL LACTATE 5 MG: 5 INJECTION, SOLUTION INTRAMUSCULAR at 15:22

## 2020-09-09 RX ADMIN — FUROSEMIDE 40 MG: 40 TABLET ORAL at 18:52

## 2020-09-09 NOTE — ASSESSMENT & PLAN NOTE
Resides in [facility]  Lately has been hypersexual and difficult to manage due to dementia at his current residence  Brought to ED for further evaluation of placement in dementia unit  Suspect dementia progression   Upon my exam, patient is pleasant and cooperative, resting OOB in recliner on room air, no complaints  Alert to person but disoriented to time and place  He is able to tell me his birthday but can not answer any other questions appropriately  Per 1:1, patient fed himself breakfast and was continent of urine  He ambulated to the bathroom with a rolling walker and 2 person assist    MRI brain 2016: "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  No acute ischemic disease  Global cerebral atrophy  NeuroQuant analysis 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

## 2020-09-09 NOTE — ED NOTES
Pt is uncooperative, agressive towards staff, and sexually inappropriate towards staff    Dr Ernesto Chaney notified     Mason Garcia, RN  09/09/20 1288

## 2020-09-09 NOTE — ED NOTES
1  CC dementia ,inapropriate behavior, needs placement in SNF locked dementia unit  2  Medications/drip HALDOL 5 MG IM  3  Abnormal labs/vitals/assessment LABS-WNL, confused, sexually inappropriate, undressing,  Soft wrist restraints on  4  Medications/drips  5  Last time narcotics given  6  IV/drains/ etc  N/A  7  Isolation status COVID NEGATIVE  8  Skin WNL  9  Ambulation status ASSIST 1  10   Phone number  11 trauma y/n     Lucinda Lilly RN  09/09/20 [phone]

## 2020-09-09 NOTE — ED PROVIDER NOTES
History  Chief Complaint   Patient presents with    Dementia     pt with hx of dementia, sent to ER for behavioral changes, pt started exposing himself and masturbating in public, sexually touching female staff  [facility] provider would like pt  to be admited to older adult psych unit     Patient is a 80year old male with dementia in a nursing home  He has been experiencing behavioral changes  He has been exposing himself in public and masturbating in public  He has been inappropriately touching female staff  He was sent here for psychiatric admission possible admission to the older adult psychiatric unit  History is limited due to dementia  Patient himself is without complaints  He denies any pain  There is no history of any fever or trauma  Prior to Admission Medications   Prescriptions Last Dose Informant Patient Reported?  Taking?   aspirin (ASPIRIN LOW DOSE) 81 MG tablet  Self Yes No   Sig: Take 325 mg by mouth daily    donepezil (ARICEPT) 10 mg tablet  Self No No   Sig: Take 1 tablet (10 mg total) by mouth daily With food   furosemide (LASIX) 40 mg tablet  Self Yes No   Sig: Take 40 mg by mouth daily    guaiFENesin (ROBITUSSIN) 100 MG/5ML oral liquid   Yes No   Sig: Take 200 mg by mouth every 4 (four) hours as needed for cough   guaiFENesin 200 MG tablet   Yes No   Sig: Take 400 mg by mouth 2 (two) times a day   levothyroxine 50 mcg tablet  Self Yes No   Sig: Take 1 tablet by mouth daily   memantine (NAMENDA) 10 mg tablet  Self No No   Sig: Take 1 tablet (10 mg total) by mouth 2 (two) times a day   potassium chloride (MICRO-K) 10 MEQ CR capsule   Yes No   Sig: Take 10 mEq by mouth daily   simvastatin (ZOCOR) 40 mg tablet   Yes No   Sig: Take 40 mg by mouth daily at bedtime      Facility-Administered Medications: None       Past Medical History:   Diagnosis Date    [facility] [address]     Heart attack ([facility] [address]     Heart disease     Hyperlipidemia     Memory loss     Thyroid disease        Past Surgical History:   Procedure Laterality Date    CATARACT EXTRACTION      CORONARY ARTERY BYPASS GRAFT      quadruple    CT ESOPHAGOGASTRODUODENOSCOPY TRANSORAL DIAGNOSTIC N/A 2/5/2019    Procedure: ESOPHAGOGASTRODUODENOSCOPY (EGD); Surgeon: Katharine Matos DO;  Location: [facility]; Service: Gastroenterology    TOTAL HIP ARTHROPLASTY         Family History   Problem Relation Age of Onset    No Known Problems Mother     No Known Problems Father     Fibromyalgia Daughter     Anemia Daughter     Hyperlipidemia Daughter      I have reviewed and agree with the history as documented  E-Cigarette/Vaping     E-Cigarette/Vaping Substances     Social History     Tobacco Use    Smoking status: Never Smoker    Smokeless tobacco: Never Used   Substance Use Topics    Alcohol use: No    Drug use: No       Review of Systems   Unable to perform ROS: Dementia       Physical Exam  Physical Exam  Vitals signs and nursing note reviewed  Constitutional:       General: He is not in acute distress  HENT:      Head: Normocephalic and atraumatic  Nose: Nose normal       Mouth/Throat:      Mouth: Mucous membranes are moist    Eyes:      General: No scleral icterus  Right eye: No discharge  Left eye: No discharge  Extraocular Movements: Extraocular movements intact  Conjunctiva/sclera: Conjunctivae normal       Pupils: Pupils are equal, round, and reactive to light  Neck:      Musculoskeletal: Normal range of motion and neck supple  No neck rigidity  Cardiovascular:      Rate and Rhythm: Regular rhythm  Bradycardia present  Pulses: Normal pulses  Heart sounds: Normal heart sounds  No murmur  Pulmonary:      Effort: Pulmonary effort is normal  No respiratory distress  Breath sounds: Normal breath sounds  No stridor  No wheezing, rhonchi or rales  Abdominal:      General: Abdomen is flat  Bowel sounds are normal  There is no distension  Tenderness:  There is no abdominal tenderness  There is no right CVA tenderness, left CVA tenderness, guarding or rebound  Musculoskeletal: Normal range of motion  General: No swelling, tenderness, deformity or signs of injury  Right lower leg: No edema  Left lower leg: No edema  Skin:     General: Skin is warm and dry  Coloration: Skin is not jaundiced  Findings: No bruising or rash  Neurological:      General: No focal deficit present  Mental Status: He is alert  Sensory: No sensory deficit  Motor: No weakness  Comments: Patient is awake alert confused   Psychiatric:         Attention and Perception: Attention normal          Mood and Affect: Mood normal          Cognition and Memory: Cognition is not impaired  Memory is not impaired           Vital Signs  ED Triage Vitals [09/09/20 1217]   Temperature Pulse Respirations Blood Pressure SpO2   98 °F (36 7 °C) (!) 50 14 118/58 98 %      Temp Source Heart Rate Source Patient Position - Orthostatic VS BP Location FiO2 (%)   Oral Monitor Lying Left arm --      Pain Score       No Pain           Vitals:    09/09/20 1217   BP: 118/58   Pulse: (!) 50   Patient Position - Orthostatic VS: Lying         Visual Acuity      ED Medications  Medications   haloperidol lactate (HALDOL) injection 5 mg (has no administration in time range)   senna (SENOKOT) tablet 8 6 mg (has no administration in time range)   furosemide (LASIX) tablet 40 mg (has no administration in time range)   donepezil (ARICEPT) tablet 10 mg (has no administration in time range)   aspirin chewable tablet 81 mg (has no administration in time range)   memantine (NAMENDA) tablet 10 mg (has no administration in time range)   levothyroxine tablet 50 mcg (has no administration in time range)   pravastatin (PRAVACHOL) tablet 80 mg (has no administration in time range)       Diagnostic Studies  Results Reviewed     Procedure Component Value Units Date/Time    Ethanol [[id number]]  (Normal) Collected:  09/09/20 1315    Lab Status:  Final result Specimen:  Blood from Arm, Right Updated:  09/09/20 1508     Ethanol Lvl <3 mg/dL     Urine Microscopic [[id number]]  (Abnormal) Collected:  09/09/20 1350    Lab Status:  Final result Specimen:  Urine, Clean Catch Updated:  09/09/20 1436     RBC, UA 0-1 /hpf      WBC, UA 0-1 /hpf      Epithelial Cells None Seen /hpf      Bacteria, UA None Seen /hpf      Hyaline Casts, UA 0-1 /lpf     CBC and differential [[id number]]  (Abnormal) Collected:  09/09/20 1315    Lab Status:  Final result Specimen:  Blood from Arm, Right Updated:  09/09/20 1432     WBC 4 12 Thousand/uL      RBC 2 55 Million/uL      Hemoglobin 8 9 g/dL      Hematocrit 29 4 %       fL      MCH 34 9 pg      MCHC 30 3 g/dL      RDW 14 8 %      MPV 11 1 fL      Platelets 519 Thousands/uL      nRBC 0 /100 WBCs     Novel Coronavirus (Covid-19),PCR [facility] [[id number]]  (Normal) Collected:  09/09/20 1316    Lab Status:  Final result Specimen:  Nares from Nose Updated:  09/09/20 1419     SARS-CoV-2 Negative    Narrative: The specimen collection materials, transport medium, and/or testing methodology utilized in the production of these test results have been proven to be reliable in a limited validation with an abbreviated program under the Emergency Utilization Authorization provided by the FDA  Testing reported as "Presumptive positive" will be confirmed with secondary testing with a reference laboratory to ensure result accuracy  Clinical caution and judgement should be used with the interpretation of these results with consideration of the clinical impression and other laboratory testing  Testing reported as "Positive" or "Negative" has been proven to be accurate according to standard laboratory validation requirements  All testing is performed with control materials showing appropriate reactivity at standard intervals        UA w Reflex to Microscopic w Reflex to Culture [[id number]]  (Abnormal) Collected:  09/09/20 1350    Lab Status:  Final result Specimen:  Urine, Clean Catch Updated:  09/09/20 1415     Color, UA Yellow     Clarity, UA Clear     Specific Nunda, UA 1 020     pH, UA 5 5     Leukocytes, UA Negative     Nitrite, UA Negative     Protein, UA Negative mg/dl      Glucose, UA Negative mg/dl      Ketones, UA Negative mg/dl      Urobilinogen, UA 0 2 E U /dl      Bilirubin, UA Negative     Blood, UA Trace-Intact    TSH [[id number]]  (Abnormal) Collected:  09/09/20 1315    Lab Status:  Final result Specimen:  Blood from Arm, Right Updated:  09/09/20 1413     TSH 3RD GENERATON 7 304 uIU/mL     Narrative:       Patients undergoing fluorescein dye angiography may retain small amounts of fluorescein in the body for 48-72 hours post procedure  Samples containing fluorescein can produce falsely depressed TSH values  If the patient had this procedure,a specimen should be resubmitted post fluorescein clearance        Comprehensive metabolic panel [[id number]]  (Abnormal) Collected:  09/09/20 1315    Lab Status:  Final result Specimen:  Blood from Arm, Right Updated:  09/09/20 1402     Sodium 144 mmol/L      Potassium 4 4 mmol/L      Chloride 107 mmol/L      CO2 29 mmol/L      ANION GAP 8 mmol/L      BUN 28 mg/dL      Creatinine 0 87 mg/dL      Glucose 90 mg/dL      Calcium 8 8 mg/dL      AST 24 U/L      ALT 19 U/L      Alkaline Phosphatase 66 U/L      Total Protein 7 5 g/dL      Albumin 3 1 g/dL      Total Bilirubin 0 60 mg/dL      eGFR 75 ml/min/1 73sq m     Narrative:       [place] guidelines for Chronic Kidney Disease (CKD):     Stage 1 with normal or high GFR (GFR > 90 mL/min/1 73 square meters)    Stage 2 Mild CKD (GFR = 60-89 mL/min/1 73 square meters)    Stage 3A Moderate CKD (GFR = 45-59 mL/min/1 73 square meters)    Stage 3B Moderate CKD (GFR = 30-44 mL/min/1 73 square meters)    Stage 4 Severe CKD (GFR = 15-29 mL/min/1 73 square meters)    Stage 5 End Stage CKD (GFR <15 mL/min/1 73 square meters)  Note: GFR calculation is accurate only with a steady state creatinine                 No orders to display              Procedures  ECG 12 Lead Documentation Only    Date/Time: 9/9/2020 1:44 PM  Performed by: Edouard Justice MD  Authorized by: Edouard Justice MD     ECG reviewed by me, the ED Provider: yes    Patient location:  ED  Comments:      Atrial fibrillation with slow ventricular response  Left anterior fascicular block  Nonspecific ST and T-wave abnormality  ED Course                                       MDM  Number of Diagnoses or Management Options  Diagnosis management comments: ED workup was nonspecific  His behavior is likely due to dementia  The personal care home did try to petition a [id number] with the county but this was denied  Patient is not capable of signing a 201  Currently patient resides in a personal care home  Is dementia sounds like it has progressed past there capabilities of managing him  Unsafe for discharge  Will admit to try to get patient placed in higher level of care  There is no UTI, glucose abnormality, intoxication, electrolyte abnormality, acute stroke, or other organic pathology to explain his behavior other than dementia    Consulted with hospitalist for admission       Amount and/or Complexity of Data Reviewed  Clinical lab tests: ordered and reviewed  Tests in the radiology section of CPT®: ordered and reviewed  Discuss the patient with other providers: yes  Independent visualization of images, tracings, or specimens: yes        Disposition  Final diagnoses:   Inappropriate sexual behavior   Dementia ([facility] [address] )     Time reflects when diagnosis was documented in both MDM as applicable and the Disposition within this note     Time User Action Codes Description Comment    9/9/2020  3:08 PM Kelvin Arreola Add [F69] Behavior problem, adult     9/9/2020  3:08 PM Marye Maxwell Remove [F69] Behavior problem, adult     9/9/2020  3:08 PM Artice Pace Add [X94 35] Inappropriate sexual behavior     9/9/2020  3:08 PM Artickeerthi Orellana Add [F03 90] Dementia [facility])       ED Disposition     ED Disposition Condition Date/Time Comment    Admit Stable Wed Sep 9, 2020  3:08 PM         Follow-up Information    None         Patient's Medications   Discharge Prescriptions    No medications on file     No discharge procedures on file      PDMP Review     None          ED Provider  Electronically Signed by           Brady Lopez MD  09/09/20 7654

## 2020-09-09 NOTE — H&P
H&P- Antoinette Stoddard 10/24/1928, 80 y o  male MRN: [id number]    Unit/Bed#: -01 Encounter: [id number]    Primary Care Provider: Radha Sloan MD   Date and time admitted to hospital: 9/9/2020 12:03 PM        * Dementia [facility])  Assessment & Plan  · With behavioral disturbances  Resides in [place], but lately has been hypersexual and difficult to manage due to dementia at his current residence  Brought to ED for further evaluation of placement in dementia unit  · Continue home dementia meds, haldol prn for agitation ordered in ED  · Continue with restraints for now       VTE Prophylaxis: Heparin  / sequential compression device   Code Status: Full Code   POLST: POLST form is not discussed and not completed at this time  Anticipated Length of Stay:  Patient will be admitted on an Inpatient basis with an anticipated length of stay of 2 midnights  Justification for Hospital Stay:  Dementia with behavioral disturbances    Total Time for Visit, including Counseling / Coordination of Care: 30 minutes  Greater than 50% of this total time spent on direct patient counseling and coordination of care  Chief Complaint:   Dementia with behavioral disturbances    History of Present Illness:    Antoinette Stoddard is a 80 y o  male who presents with dementia with behavioral disturbances  Patient is a resident of [facility], however recently has been having worsening of behavioral disturbances  Patient is reported to be hypersexual and master base frequently in front of staff  At this time, [facility] brought him to the ED for further evaluation and placement into a dementia unit  At this time, patient has no complaints and is not reporting any symptoms      Review of Systems:    See above    Past Medical and Surgical History:     Past Medical History:   Diagnosis Date    Dementia ([facility] [address] )     Heart attack ([facility] [address] )     Heart disease     Hyperlipidemia     Memory loss     Thyroid disease        Past Surgical History:   Procedure Laterality Date    CATARACT EXTRACTION      CORONARY ARTERY BYPASS GRAFT      quadruple    MI ESOPHAGOGASTRODUODENOSCOPY TRANSORAL DIAGNOSTIC N/A 2/5/2019    Procedure: ESOPHAGOGASTRODUODENOSCOPY (EGD); Surgeon: Rianna Cartagena DO;  Location: [facility]; Service: Gastroenterology    TOTAL HIP ARTHROPLASTY         Meds/Allergies:    Prior to Admission medications    Medication Sig Start Date End Date Taking? Authorizing Provider   aspirin (ASPIRIN LOW DOSE) 81 MG tablet Take 325 mg by mouth daily     Historical Provider, MD   donepezil (ARICEPT) 10 mg tablet Take 1 tablet (10 mg total) by mouth daily With food 12/17/18   Mian Foster MD   furosemide (LASIX) 40 mg tablet Take 40 mg by mouth daily  1/21/19   Historical Provider, MD   guaiFENesin (ROBITUSSIN) 100 MG/5ML oral liquid Take 200 mg by mouth every 4 (four) hours as needed for cough    Historical Provider, MD   guaiFENesin 200 MG tablet Take 400 mg by mouth 2 (two) times a day    Historical Provider, MD   levothyroxine 50 mcg tablet Take 1 tablet by mouth daily    Historical Provider, MD   memantine (NAMENDA) 10 mg tablet Take 1 tablet (10 mg total) by mouth 2 (two) times a day 12/14/18   Mian Foster MD   potassium chloride (MICRO-K) 10 MEQ CR capsule Take 10 mEq by mouth daily    Historical Provider, MD   simvastatin (ZOCOR) 40 mg tablet Take 40 mg by mouth daily at bedtime    Historical Provider, MD SUTTON have reviewed home medications with patient personally  Allergies: No Known Allergies    Social History:     Marital Status:     Occupation: na  Patient Pre-hospital Living Situation: na  Patient Pre-hospital Level of Mobility: na  Patient Pre-hospital Diet Restrictions: na  Substance Use History:   Social History     Substance and Sexual Activity   Alcohol Use No     Social History     Tobacco Use   Smoking Status Never Smoker   Smokeless Tobacco Never Used     Social History     Substance and Sexual Activity Drug Use No       Family History:    non-contributory    Physical Exam:     Vitals:   Blood Pressure: 118/58 (09/09/20 1217)  Pulse: (!) 50 (09/09/20 1217)  Temperature: 98 °F (36 7 °C) (09/09/20 1217)  Temp Source: Oral (09/09/20 1217)  Respirations: 14 (09/09/20 1217)  Weight - Scale: 68 kg (149 lb 14 6 oz) (09/09/20 1217)  SpO2: 98 % (09/09/20 1217)    Physical Exam  HENT:      Head: Normocephalic and atraumatic  Cardiovascular:      Rate and Rhythm: Bradycardia present  Pulmonary:      Effort: No respiratory distress  Abdominal:      General: Abdomen is flat  Palpations: Abdomen is soft  Skin:     General: Skin is warm and dry  Neurological:      Mental Status: He is alert  Mental status is at baseline  Comments: Demented             Additional Data:     Lab Results: I have personally reviewed pertinent reports  Results from last 7 days   Lab Units 09/09/20  1315   WBC Thousand/uL 4 12*   HEMOGLOBIN g/dL 8 9*   HEMATOCRIT % 29 4*   PLATELETS Thousands/uL 102*   BANDS PCT % 4   LYMPHO PCT % 30   MONO PCT % 18*   EOS PCT % 1     Results from last 7 days   Lab Units 09/09/20  1315   SODIUM mmol/L 144   POTASSIUM mmol/L 4 4   CHLORIDE mmol/L 107   CO2 mmol/L 29   BUN mg/dL 28*   CREATININE mg/dL 0 87   ANION GAP mmol/L 8   CALCIUM mg/dL 8 8   ALBUMIN g/dL 3 1*   TOTAL BILIRUBIN mg/dL 0 60   ALK PHOS U/L 66   ALT U/L 19   AST U/L 24   GLUCOSE RANDOM mg/dL 90                       Imaging: I have personally reviewed pertinent reports  No orders to display       EKG, Pathology, and Other Studies Reviewed on Admission:   · EKG:  Pending completion    [place] [facility] Records Reviewed: Yes     ** Please Note: This note has been constructed using a voice recognition system   **

## 2020-09-10 ENCOUNTER — APPOINTMENT (INPATIENT)
Dept: CT IMAGING | Facility: HOSPITAL | Age: 85
DRG: 884 | End: 2020-09-10
Payer: MEDICARE

## 2020-09-10 PROBLEM — E03.9 HYPOTHYROIDISM: Status: ACTIVE | Noted: 2020-09-10

## 2020-09-10 PROBLEM — I48.91 ATRIAL FIBRILLATION (HCC): Status: ACTIVE | Noted: 2020-09-10

## 2020-09-10 PROBLEM — I73.9 PVD (PERIPHERAL VASCULAR DISEASE) (HCC): Status: ACTIVE | Noted: 2020-09-10

## 2020-09-10 PROBLEM — E44.1 MILD PROTEIN-CALORIE MALNUTRITION (HCC): Status: ACTIVE | Noted: 2020-09-10

## 2020-09-10 PROBLEM — D64.9 ANEMIA: Status: ACTIVE | Noted: 2020-09-10

## 2020-09-10 LAB
ATRIAL RATE: 107 BPM
ATRIAL RATE: 416 BPM
FERRITIN SERPL-MCNC: 232 NG/ML (ref 8–388)
FOLATE SERPL-MCNC: 13.6 NG/ML (ref 3.1–17.5)
IRON SATN MFR SERPL: 28 %
IRON SERPL-MCNC: 80 UG/DL (ref 65–175)
QRS AXIS: -38 DEGREES
QRS AXIS: 228 DEGREES
QRSD INTERVAL: 124 MS
QRSD INTERVAL: 130 MS
QT INTERVAL: 462 MS
QT INTERVAL: 528 MS
QTC INTERVAL: 425 MS
QTC INTERVAL: 451 MS
T WAVE AXIS: 120 DEGREES
T WAVE AXIS: 58 DEGREES
TIBC SERPL-MCNC: 284 UG/DL (ref 250–450)
VENTRICULAR RATE: 44 BPM
VENTRICULAR RATE: 51 BPM
VIT B12 SERPL-MCNC: 242 PG/ML (ref 100–900)

## 2020-09-10 PROCEDURE — 93010 ELECTROCARDIOGRAM REPORT: CPT | Performed by: INTERNAL MEDICINE

## 2020-09-10 PROCEDURE — 70450 CT HEAD/BRAIN W/O DYE: CPT

## 2020-09-10 PROCEDURE — 99232 SBSQ HOSP IP/OBS MODERATE 35: CPT | Performed by: NURSE PRACTITIONER

## 2020-09-10 PROCEDURE — 97167 OT EVAL HIGH COMPLEX 60 MIN: CPT

## 2020-09-10 PROCEDURE — G1004 CDSM NDSC: HCPCS

## 2020-09-10 PROCEDURE — 93005 ELECTROCARDIOGRAM TRACING: CPT

## 2020-09-10 PROCEDURE — 97163 PT EVAL HIGH COMPLEX 45 MIN: CPT

## 2020-09-10 RX ORDER — ACETAMINOPHEN 325 MG/1
650 TABLET ORAL EVERY 6 HOURS PRN
Status: DISCONTINUED | OUTPATIENT
Start: 2020-09-10 | End: 2020-09-16 | Stop reason: HOSPADM

## 2020-09-10 RX ORDER — ONDANSETRON 2 MG/ML
4 INJECTION INTRAMUSCULAR; INTRAVENOUS EVERY 6 HOURS PRN
Status: DISCONTINUED | OUTPATIENT
Start: 2020-09-10 | End: 2020-09-16 | Stop reason: HOSPADM

## 2020-09-10 RX ORDER — QUETIAPINE FUMARATE 25 MG/1
25 TABLET, FILM COATED ORAL 2 TIMES DAILY PRN
Status: DISCONTINUED | OUTPATIENT
Start: 2020-09-10 | End: 2020-09-12

## 2020-09-10 RX ORDER — FUROSEMIDE 20 MG/1
20 TABLET ORAL DAILY
Status: DISCONTINUED | OUTPATIENT
Start: 2020-09-11 | End: 2020-09-16 | Stop reason: HOSPADM

## 2020-09-10 RX ORDER — LANOLIN ALCOHOL/MO/W.PET/CERES
6 CREAM (GRAM) TOPICAL
Status: DISCONTINUED | OUTPATIENT
Start: 2020-09-10 | End: 2020-09-16 | Stop reason: HOSPADM

## 2020-09-10 RX ADMIN — FUROSEMIDE 40 MG: 40 TABLET ORAL at 10:45

## 2020-09-10 RX ADMIN — QUETIAPINE FUMARATE 25 MG: 25 TABLET ORAL at 20:27

## 2020-09-10 RX ADMIN — LEVOTHYROXINE SODIUM 50 MCG: 50 TABLET ORAL at 10:45

## 2020-09-10 RX ADMIN — ASPIRIN 81 MG 81 MG: 81 TABLET ORAL at 10:45

## 2020-09-10 RX ADMIN — DONEPEZIL HYDROCHLORIDE 10 MG: 5 TABLET ORAL at 10:45

## 2020-09-10 RX ADMIN — HEPARIN SODIUM 5000 UNITS: 5000 INJECTION INTRAVENOUS; SUBCUTANEOUS at 21:25

## 2020-09-10 RX ADMIN — SENNOSIDES 8.6 MG: 8.6 TABLET, FILM COATED ORAL at 21:25

## 2020-09-10 RX ADMIN — MELATONIN 6 MG: 3 TAB ORAL at 21:25

## 2020-09-10 RX ADMIN — HEPARIN SODIUM 5000 UNITS: 5000 INJECTION INTRAVENOUS; SUBCUTANEOUS at 05:20

## 2020-09-10 RX ADMIN — PRAVASTATIN SODIUM 80 MG: 80 TABLET ORAL at 20:32

## 2020-09-10 RX ADMIN — MEMANTINE 10 MG: 10 TABLET ORAL at 10:46

## 2020-09-10 RX ADMIN — MEMANTINE 10 MG: 10 TABLET ORAL at 20:32

## 2020-09-10 NOTE — CONSULTS
Consultation - [address] [place], [place] [address] [address] 80 [facility]  [facility] MRN: [id number]  Unit/Bed#: -01 Encounter: [id number]      Name: Miah Storey   : 10/24/1928  Date: 2020    Time: 10:20 pm  [facility] of [facility]: [facility] Location of doctor: [place], [place]  Length of consult: 39 minutesThis evaluation was conducted via telepsychiatry with the assistance of onsite staff    Chief Complaint: Behavioral disturbances  History of Present Illness:   Mr Miah Storey is a 80year old, , male, who resides in [address] [place] [address] in [address]  He has a past psychiatric history significant for major neurocognitive disorder (dementia) no known prior inpatient psychiatric hospitalizations, no prior suicide attempts, no known history of non-suicidal self-injurious behavior, and no known concerns for chemical dependency  He has a past medical history significant for hyperlipidemia and dysphagia  He presented to the emergency room on the afternoon of 20 from the nursing home with behavioral changes associated with dementia including inappropriate sexual behaviors  Psychiatry was consulted for psychiatric evaluation  On psychiatric interview, Mr Jt Brown states, Divya Seat the hell is that, who is at the front door, there is a side and a front to the door  Collateral: Collateral obtained from nursing  SI/ Self harm:  No known history of suicidal ideation; no known history of non-suicidal self-injurious behavior  HI/Violence:  No known history of violence; no known history of homicidal ideations  Trauma history: Unable to assess  Sex/human trafficking: Unable to assess  Access to guns: Unable to assess  Legal: Unable to assess  Psychiatric History/Treatment History: No known inpatient psychiatric hospitalizations based on electronic medical record  Drug/Alcohol History: Unable to assess    Medical History:  hyperlipidemia and dysphagia  Medications & Freq:   Donepezil 10 mg daily  Memantine 10 mg twice daily   Allergies: No known drug allergies   Sleep: Quantity/Quality: Unable to assess  Family Psych History/History of suicide: Unable to assess  Social History: lives at [address] [place] [address] in [facility]   Relationship status:    Employment: unknown   Education: unknown   Stressors: chronic mental health concerns, chronic medical conditions   Strengths/supports access to transportation, access to medical care  Mental Status Exam:   Appearance and attire: appropriate level of hygiene and self-grooming, dressed in hospital gown  Attitude and behavior: irritable, minimally cooperative, makes fair eye contact  Speech: mumbling and difficult to understand  Affect and mood: irritable  Association and thought processes: loosening of associations, disorganized in thought processes  Thought content: unable to assess  Perception: did not appear to be responding to internal stimuli   Sensorium, memory, and orientation: alert and oriented to self, not oriented to location, year, month, day of the week  Intellectual functioning: based on grammar, syntax, and articulation, intellectual functioning is below average  Insight and judgment: poor insight; poor judgement   Impression/Risk Assessment:   Mr Prem Hickman is a 80year old, , male, who resides in [address] [place] [address] in [place], [place]  He has a past psychiatric history significant for major neurocognitive disorder (dementia) no known prior inpatient psychiatric hospitalizations, no prior suicide attempts, no known history of non-suicidal self-injurious behavior, and no known concerns for chemical dependency  He has a past medical history significant for hyperlipidemia and dysphagia  He presented to the emergency room on the afternoon of 9/9/20 from the nursing home with behavioral changes associated with dementia including inappropriate sexual behaviors   Psychiatry was consulted for psychiatric evaluation  Patient likely will requires a higher level of nursing home care, given the concerns from the facility  He has a progressive dementia and behavioral disturbances within the major neurocognitive disorder  Diagnosis:   Dementia with behavioral disturbances    Treatment Recommendations:   Patient requires 1:1 psychiatric technician for behavioral monitoring and safety  Will need to obtain additional collateral information on the morning of 9/10 in terms of the patients baseline level of functioning  Consider PT/OT evaluation for level of functioning evaluation  Consider neurology consultation  Will be important to have familiar people and objects in the room and to remind the patient regularly of the time, day, year, his current location, and why he is in the hospital  Familiar objects can also help such as pictures or sentimental items  It will be important to encourage ambulation with the help of staff, increasing ambulation helps in clearing episodes of confusion  It will also be important to limit the use of benzodiazepines as we know this can worsen underlying cognitive deficits  Would recommend thyroid panel including (TSH, T4, T3) homocysteine, methylmalonic acid, and B12  For episodes of severe, acute, agitation that cannot be managed with behavioral interventions such as redirection or distraction consider would recommend quetiapine 25 mg twice daily, if episodes of aggression and agitation persist could increase to 50 mg twice daily over the next 12 hours  Continue to monitor EKG and electrolytes for cardiac arrythmia and torsades de pointes  Continue donepezil 10 mg daily for cognitive deficits  Continue memantine 10 mg twice daily for cognitive deficits  Observation level: Patient requires 1:1 psychiatric technician for behavioral monitoring and safety    Pharmacological: see above medication recommendations  Therapy: Patient would benefit from outpatient neurology or geriatric psychiatrist     Level of Care: Continue current status        Inpatient consult to Psychiatry  Consult performed by: Tatyana Troncoso DO  Consult ordered by: Pool Ramirez MD

## 2020-09-10 NOTE — PLAN OF CARE
Problem: Potential for Falls  Goal: Patient will remain free of falls  Description: INTERVENTIONS:  - Assess patient frequently for physical needs  -  Identify cognitive and physical deficits and behaviors that affect risk of falls  -  [place] fall precautions as indicated by assessment   - Educate patient/family on patient safety including physical limitations  - Instruct patient to call for assistance with activity based on assessment  - Modify environment to reduce risk of injury  - Consider OT/PT consult to assist with strengthening/mobility  Outcome: Progressing     Problem: Prexisting or High Potential for Compromised Skin Integrity  Goal: Skin integrity is maintained or improved  Description: INTERVENTIONS:  - Identify patients at risk for skin breakdown  - Assess and monitor skin integrity  - Assess and monitor nutrition and hydration status  - Monitor labs   - Assess for incontinence   - Turn and reposition patient  - Assist with mobility/ambulation  - Relieve pressure over bony prominences  - Avoid friction and shearing  - Provide appropriate hygiene as needed including keeping skin clean and dry  - Evaluate need for skin moisturizer/barrier cream  - Collaborate with interdisciplinary team   - Patient/family teaching  - Consider wound care consult   Outcome: Progressing     Problem: SAFETY ADULT  Goal: Patient will remain free of falls  Description: INTERVENTIONS:  - Assess patient frequently for physical needs  -  Identify cognitive and physical deficits and behaviors that affect risk of falls    -  [place] fall precautions as indicated by assessment   - Educate patient/family on patient safety including physical limitations  - Instruct patient to call for assistance with activity based on assessment  - Modify environment to reduce risk of injury  - Consider OT/PT consult to assist with strengthening/mobility  Outcome: Progressing  Goal: Maintain or return to baseline ADL function  Description: INTERVENTIONS:  -  Assess patient's ability to carry out ADLs; assess patient's baseline for ADL function and identify physical deficits which impact ability to perform ADLs (bathing, care of mouth/teeth, toileting, grooming, dressing, etc )  - Assess/evaluate cause of self-care deficits   - Assess range of motion  - Assess patient's mobility; develop plan if impaired  - Assess patient's need for assistive devices and provide as appropriate  - Encourage maximum independence but intervene and supervise when necessary  - Involve family in performance of ADLs  - Assess for home care needs following discharge   - Consider OT consult to assist with ADL evaluation and planning for discharge  - Provide patient education as appropriate  Outcome: Progressing  Goal: Maintain or return mobility status to optimal level  Description: INTERVENTIONS:  - Assess patient's baseline mobility status (ambulation, transfers, stairs, etc )    - Identify cognitive and physical deficits and behaviors that affect mobility  - Identify mobility aids required to assist with transfers and/or ambulation (gait belt, sit-to-stand, lift, walker, cane, etc )  - [place] fall precautions as indicated by assessment  - Record patient progress and toleration of activity level on Mobility SBAR; progress patient to next Phase/Stage  - Instruct patient to call for assistance with activity based on assessment  - Consider rehabilitation consult to assist with strengthening/weightbearing, etc   Outcome: Progressing     Problem: DISCHARGE PLANNING  Goal: Discharge to home or other facility with appropriate resources  Description: INTERVENTIONS:  - Identify barriers to discharge w/patient and caregiver  - Arrange for needed discharge resources and transportation as appropriate  - Identify discharge learning needs (meds, wound care, etc )  - Arrange for interpretive services to assist at discharge as needed  - Refer to Case Management Department for coordinating discharge planning if the patient needs post-hospital services based on physician/advanced practitioner order or complex needs related to functional status, cognitive ability, or social support system  Outcome: Progressing     Problem: Knowledge Deficit  Goal: Patient/family/caregiver demonstrates understanding of disease process, treatment plan, medications, and discharge instructions  Description: Complete learning assessment and assess knowledge base    Interventions:  - Provide teaching at level of understanding  - Provide teaching via preferred learning methods  Outcome: Progressing

## 2020-09-10 NOTE — ASSESSMENT & PLAN NOTE
Malnutrition Findings:         Clavicular prominence noted  BMI Findings: Body mass index is 23 67 kg/m²     As evidenced by muscle wasting  · Continue nutrition supplements

## 2020-09-10 NOTE — ASSESSMENT & PLAN NOTE
· Resides in [facility]  Lately has been hypersexual and difficult to manage due to dementia at his current residence  Brought to ED for further evaluation of placement in dementia unit  · Likely dementia progression   · Psychiatry evaluated,  · Recommending continuation of 1:1, pt also in non-violent wrist restraints secondary to agitation today   · Has not showed any increased hypersexual behaviors here   · Unable to reach patient's daughter by multiple providers, will call [facility] tomorrow for more collateral info  · PT/OT recommending STR  · TSH 10 672, free T3/T4 pending   · Continue Aricept 10 mg daily and Memantine 10 mg BID, pt has been refusing these medications this morning   · CT head negative for acute intracranial abnormality   · Will start Seroquel 25 mg BID PRN for agitation   · Would recommend repeat psychiatry evaluation for possible inpatient collin psychiatry evaluation  · If there is concern for an acute change - consider obtaining an MRI brain to r/o CVA or mass   Low suspicion for any CVA at this time, appears to be secondary to underlying dementia

## 2020-09-10 NOTE — ASSESSMENT & PLAN NOTE
Malnutrition Findings:           BMI Findings: Body mass index is 23 67 kg/m²     As evidenced by muscle wasting  · Will add nutrition supplements

## 2020-09-10 NOTE — PLAN OF CARE
Problem: OCCUPATIONAL THERAPY ADULT  Goal: Performs self-care activities at highest level of function for planned discharge setting  See evaluation for individualized goals  Description: Treatment Interventions: ADL retraining, Functional transfer training, Endurance training, Patient/family training, Activityengagement, UE strengthening/ROM          See flowsheet documentation for full assessment, interventions and recommendations  Note: Limitation: Decreased ADL status, Decreased Safe judgement during ADL, Decreased cognition, Decreased endurance, Decreased self-care trans, Decreased high-level ADLs, Decreased fine motor control  Prognosis: Guarded  Assessment: Patient is a 80 y o  male seen for OT evaluation s/p admit to [id number] [place] [address] on 9/9/2020 w/Dementia with behavioral disturbance ([facility] [address] )  Commorbidities affecting patient's functional performance at time of assessment include: anemia, atrial fibrillation, mild protein-calorie malnutrition, hypothyroidism, PVD, and hyperlipidemia  Orders placed for OT evaluation and treatment  Performed at least two patient identifiers during session including name and wristband  Prior to admission, patient was living at [facility] where he received assistance from facility staff for ADLs and IADLs  Personal factors affecting patient at time of initial evaluation include: limited insight into deficits, difficulty performing ADLs and difficulty performing IADLs  Upon evaluation, patient requires moderate assist for UB ADLs, maximal assist for LB ADLs, transfers and functional ambulation in room and bathroom with minimal  assist x2, with the use of Rolling Walker    Patient is oriented to name,, disoriented to time,, disoriented to place, and disoriented to situation,, presents with limited ability to recall information after a brief period of time (short term memory) / working memory , acquire new concepts and behaviors (learning) and impaired judgement, inability to make safe decisions  Occupational performance is affected by the following deficits: orientation, impulsive behavior, degenerative arthritic joint changes, impaired fine motor coordination, dynamic sit/ stand balance deficit with poor standing tolerance time for self care and functional mobility, decreased activity tolerance, impaired memory, impaired problem solving, decreased safety awareness and postural control and postural alignment deficit, requiring external assistance to complete transitional movements  Therapist completed  extensive additional review of medical records and additional review of physical, cognitive or psychosocial history, clinical examination identifying 5 or more performance deficits, clinical decision making of a high complexity , consistent with a high complexity level evaluation  Patient to benefit from continued Occupational Therapy treatment while in the hospital to address deficits as defined above and maximize level of functional independence with ADLs and functional mobility   Occupational Performance areas to address include: eating, grooming , bathing/ shower, dressing, toilet hygiene, transfer to all surfaces, functional mobility, community mobility, emergency response,     OT Discharge Recommendation: [address]  OT - OK to Discharge: Yes(once medically cleared)

## 2020-09-10 NOTE — ASSESSMENT & PLAN NOTE
· TSH elevated  · Free T3/T4 pending   · Continue Levothyroxine 50 mcg daily pending results  · Pt will need repeat thyroid function testing in 4-6 weeks

## 2020-09-10 NOTE — PLAN OF CARE
Problem: PHYSICAL THERAPY ADULT  Goal: Performs mobility at highest level of function for planned discharge setting  See evaluation for individualized goals  Description: Treatment/Interventions: Functional transfer training, LE strengthening/ROM, Therapeutic exercise, Endurance training, Bed mobility, Gait training, Patient/family training, Equipment eval/education  Equipment Recommended: Barrett Sharma       See flowsheet documentation for full assessment, interventions and recommendations  Outcome: Progressing  Note: Prognosis: Fair  Problem List: Decreased strength, Decreased endurance, Impaired balance, Decreased mobility, Decreased cognition, Decreased safety awareness  Assessment: Pt is 80 y o  male seen for PT evaluation s/p admit to [facility] on 9/9/2020 w/ Dementia with behavioral disturbance ([facility] [address] )  PT consulted to assess pt's functional mobility and d/c needs  Order placed for PT eval and tx, w/ activity order  Comorbidities affecting pt's physical performance at time of assessment include: dementia, PVD, Anemia, dysphagia, behavioral disturbance  PTA, pt was requiring A for mobility and Prior functional history limited due to cognitive deficits    Personal factors affecting pt at time of IE include: ambulating w/ assistive device, decreased cognition, inability to perform IADLs, inability to perform ADLs and inability to live alone  Please find objective findings from PT assessment regarding body systems outlined above with impairments and limitations including weakness, impaired balance, decreased endurance, gait deviations, decreased activity tolerance, decreased functional mobility tolerance, fall risk and decreased cognition  The following objective measures performed on IE also reveal limitations: Barthel Index: 40/100  Pt's clinical presentation is currently unstable/unpredictable seen in pt's presentation    Pt to benefit from continued PT tx to address deficits as defined above and maximize level of functional independent mobility and consistency  From PT/mobility standpoint, recommendation at time of d/c would be post acute rehabilitation pending progress in order to facilitate return to PLOF  Barriers to Discharge: Decreased caregiver support, Other (Comment)(decline functional mobility)     PT Discharge Recommendation: Post-Acute Rehabilitation Services     PT - OK to Discharge: Yes    See flowsheet documentation for full assessment

## 2020-09-10 NOTE — PHYSICAL THERAPY NOTE
PT Initial Evaluation  Physical Therapy Evaluation     Patient's Name: Rita Aponte    Admitting Diagnosis  Dementia ([facility] ) [F03 90]  Inappropriate sexual behavior [Z72 89]    Problem List  Patient Active Problem List   Diagnosis    Dementia with behavioral disturbance ([place] [address] )    Dysphagia    Hematuria    Hyperlipidemia    PVD (peripheral vascular disease) ([place] [address] )    Anemia    Mild protein-calorie malnutrition ([place] [address] )    Hypothyroidism       Past Medical History  Past Medical History:   Diagnosis Date    Dementia ([place] [address] )     Heart attack ([place] [address] )     Heart disease     Hyperlipidemia     Memory loss     Thyroid disease        Past Surgical History  Past Surgical History:   Procedure Laterality Date    CATARACT EXTRACTION      CORONARY ARTERY BYPASS GRAFT      quadruple    HI ESOPHAGOGASTRODUODENOSCOPY TRANSORAL DIAGNOSTIC N/A 2/5/2019    Procedure: ESOPHAGOGASTRODUODENOSCOPY (EGD); Surgeon: Yobani Thompson DO;  Location: [facility]; Service: Gastroenterology    TOTAL HIP ARTHROPLASTY            09/10/20 7495   Note Type   Note type Eval only   Pain Assessment   Pain Assessment Tool FLACC   Pain Rating: FLACC (Rest) - Face 0   Pain Rating: FLACC (Rest) - Legs 0   Pain Rating: FLACC (Rest) - Activity 0   Pain Rating: FLACC (Rest) - Cry 0   Pain Rating: FLACC (Rest) - Consolability 0   Score: FLACC (Rest) 0   Pain Rating: FLACC (Activity) - Face 0   Pain Rating: FLACC (Activity) - Legs 0   Pain Rating: FLACC (Activity) - Activity 0   Pain Rating: FLACC (Activity) - Cry 0   Pain Rating: FLACC (Activity) - Consolability 0   Score: FLACC (Activity) 0   Home Living   Type of Home Assisted living   Home Layout One level   Additional Comments Pt  unable to answer questions regarding PLOF/home set up      Prior Function   Level of [place] Needs assistance with ADLs and functional mobility   Lives With Facility staff   ADL Assistance Needs assistance   IADLs Needs assistance   Restrictions/Precautions   Other Precautions Fall Risk;Bed Alarm; Chair Alarm;Multiple lines;Telemetry;Cognitive   Cognition   Overall Cognitive Status WFL   Arousal/Participation Alert   Orientation Level Oriented X4   Memory Within functional limits   Following Commands Follows all commands and directions without difficulty   RLE Assessment   RLE Assessment   (grossly >3+  unable to further assess)   LLE Assessment   LLE Assessment   (grossly >3+  unable to further assess)   Bed Mobility   Supine to Sit 3  Moderate assistance   Additional items Assist x 1   Additional Comments Pt  left seated in recliner alarm intact , all needs in reach s/p session  Pt  with 1:1 at time of IE  Transfers   Sit to Stand 4  Minimal assistance   Additional items Increased time required;Assist x 2   Stand to Sit 4  Minimal assistance   Additional items Assist x 2; Increased time required;Verbal cues   Ambulation/Elevation   Gait pattern Decreased foot clearance; Short stride; Inconsistent abimael; Forward Flexion  (safety concerns with RW use, vc and tactile cues with safety)   Gait Assistance 4  Minimal assist   Additional items Assist x 2   Assistive Device Rolling walker   Distance 12'x1   Balance   Static Sitting Good   Dynamic Sitting Fair   Static Standing Fair -   Dynamic Standing Fair -   Ambulatory Poor +   Endurance Deficit   Endurance Deficit Yes   Activity Tolerance   Activity Tolerance Patient limited by fatigue  (cognition)   Nurse Made Aware RN ok to see   Assessment   Prognosis Fair   Problem List Decreased strength;Decreased endurance; Impaired balance;Decreased mobility; Decreased cognition;Decreased safety awareness   Assessment Pt is 80 y o  male seen for PT evaluation s/p admit to [facility] on 9/9/2020 w/ Dementia with behavioral disturbance ([facility] [address] )  PT consulted to assess pt's functional mobility and d/c needs  Order placed for PT eval and tx, w/ activity order   Comorbidities affecting pt's physical performance at time of assessment include: dementia, PVD, Anemia, dysphagia, behavioral disturbance  PTA, pt was requiring A for mobility and Prior functional history limited due to cognitive deficits    Personal factors affecting pt at time of IE include: ambulating w/ assistive device, decreased cognition, inability to perform IADLs, inability to perform ADLs and inability to live alone  Please find objective findings from PT assessment regarding body systems outlined above with impairments and limitations including weakness, impaired balance, decreased endurance, gait deviations, decreased activity tolerance, decreased functional mobility tolerance, fall risk and decreased cognition  The following objective measures performed on IE also reveal limitations: Barthel Index: 40/100  Pt's clinical presentation is currently unstable/unpredictable seen in pt's presentation   Pt to benefit from continued PT tx to address deficits as defined above and maximize level of functional independent mobility and consistency  From PT/mobility standpoint, recommendation at time of d/c would be post acute rehabilitation pending progress in order to facilitate return to PLOF  Barriers to Discharge Decreased caregiver support; Other (Comment)  (decline functional mobility)   Goals   Patient Goals to sit up   STG Expiration Date 09/24/20   Short Term Goal #1 1  Pt will complete bed mobility with Mod I to increase functional mobility  2  Pt will complete sit to stand transfers with Mod I to increase functional mobility  3  Pt will ambulate 150 ft with RW with Mod I without LOB 4  Pt will increase B/L LE strength by 1 grade to facilitate improved functional mobility with decreased risk of falls  5  Pt will increase standing balance to fair in order to decrease risk of falls   PT Treatment Day 0   Plan   Treatment/Interventions Functional transfer training;LE strengthening/ROM; Therapeutic exercise; Endurance training;Bed mobility;Gait training;Patient/family training;Equipment eval/education   PT Frequency   (3-5x/wk)   Recommendation   PT Discharge Recommendation Post-Acute Rehabilitation Services   Equipment Recommended Walker   PT - OK to Discharge Yes   Additional Comments when medically stable   Barthel Index   Feeding 5   Bathing 0   Grooming Score 0   Dressing Score 5   Bladder Score 10   Bowels Score 10   Toilet Use Score 5   Transfers (Bed/Chair) Score 5   Mobility (Level Surface) Score 0   Stairs Score 0   Barthel Index Score 40         Placido Thompson, PT

## 2020-09-10 NOTE — PROGRESS NOTES
Progress Note - Yancy Cueva 10/24/1928, 80 y o  male MRN: [id number]    Unit/Bed#: -01 Encounter: [id number]    Primary Care Provider: Forrest Brandon MD   Date and time admitted to hospital: 9/9/2020 12:03 PM        * Dementia with behavioral disturbance [facility])  Assessment & Plan  Resides in [facility]  Lately has been hypersexual and difficult to manage due to dementia at his current residence  Brought to ED for further evaluation of placement in dementia unit  Suspect dementia progression   Upon my exam, patient is pleasant and cooperative, resting OOB in recliner on room air, no complaints  Alert to person but disoriented to time and place  He is able to tell me his birthday but can not answer any other questions appropriately  Per 1:1, patient fed himself breakfast and was continent of urine  He ambulated to the bathroom with a rolling walker and 2 person assist    MRI brain 2016: "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  No acute ischemic disease  Global cerebral atrophy  NeuroQuant analysis consistent with nonspecific neuro degenerative disorder not necessarily medial temporal lobe specific "  · Appreciate psychiatry input   · Will continue Aricept and Namenda, but reduce Aricept to 5 mg as it can cause bradycardia/conduction blocks  · Will check TSH, free t3, free t4, B12, folate, SED rate, homocysteine, and methylmalonic acid  · Will check a CT head   · Will start Seroquel 25 mg BID PRN for agitation   · Will decrease lasix from 40 mg to 20 mg   · Discontinue 1:1   · Discontinue restraints   · Fall precautions   · Attempted to call daughter to obtain information as to whether this behavior is chronic or if this was an acute change   If there is concern for an acute change - consider obtaining an MRI brain to r/o CVA or mass    Anemia  Assessment & Plan  Macrocytic anemia   · Check B12, folate, and iron panel  · Trend CBC in am     Atrial fibrillation [facility])  Assessment & Plan  EKG showed slow a fib with HR 44  [id number]  · Reduce Aricept to 5 mg as it can cause bradycardia/conduction blocks    Mild protein-calorie malnutrition (HCC)  Assessment & Plan  Malnutrition Findings:           BMI Findings: Body mass index is 23 67 kg/m²  As evidenced by muscle wasting  · Will add nutrition supplements     Hypothyroidism  Assessment & Plan  Check tsh, free t3, and free t4  · Continue synthroid     PVD (peripheral vascular disease) ([facility] [address] )  Assessment & Plan  · Continue statin     Hyperlipidemia  Assessment & Plan  · Continue statin     VTE Pharmacologic Prophylaxis:   Pharmacologic: Heparin  Mechanical VTE Prophylaxis in Place: Yes    Patient Centered Rounds: I have performed bedside rounds with nursing staff today  Discussions with Specialists or Other Care Team Provider: nursing, cm    Education and Discussions with Family / Patient: I have answered all questions to the best of my ability  Called daughter and left voicemail     Time Spent for Care: 30 minutes  More than 50% of total time spent on counseling and coordination of care as described above  Current Length of Stay: 1 day(s)    Current Patient Status: Inpatient   Certification Statement: The patient will continue to require additional inpatient hospital stay due to dementia with behavorial disturbance, anemia requiring further workup    Discharge Plan: Patient is not medically stable for discharge today, likely to a dementia unit vs returning back to NH in next 24-48 hours pending lab work and behavior  Code Status: No Order      Subjective:   Patient seen and examined  Pleasantly confused  Very poor historian  Was only able to tell me his date of birth  The names of his wife or children, where he was born, what year it is, where he is, or what brought him here  He is on a 1:1 and per the 1:1 he was able to feed himself breakfast and he has not been hypersexual towards her       Objective: Vitals:   Temp (24hrs), Av 7 °F (36 5 °C), Min:97 6 °F (36 4 °C), Max:97 8 °F (36 6 °C)    Temp:  [97 6 °F (36 4 °C)-97 8 °F (36 6 °C)] 97 6 °F (36 4 °C)  HR:  [50-52] 52  Resp:  [16-17] 16  BP: (142-150)/(62-68) 150/67  SpO2:  [98 %] 98 %  Body mass index is 23 67 kg/m²  Input and Output Summary (last 24 hours): Intake/Output Summary (Last 24 hours) at 9/10/2020 1424  Last data filed at 9/10/2020 1300  Gross per 24 hour   Intake 360 ml   Output 280 ml   Net 80 ml       Physical Exam:     Physical Exam  Constitutional:       General: He is not in acute distress  Appearance: Normal appearance  He is not toxic-appearing or diaphoretic  Comments: Pleasantly confused gentleman resting OOB in recliner with his feet elevated on room air    HENT:      Head: Normocephalic  Neck:      Musculoskeletal: Normal range of motion  Cardiovascular:      Rate and Rhythm: Regular rhythm  Bradycardia present  Pulmonary:      Effort: Pulmonary effort is normal       Breath sounds: Normal breath sounds  Abdominal:      General: Bowel sounds are normal    Musculoskeletal: Normal range of motion  Skin:     General: Skin is warm and dry  Capillary Refill: Capillary refill takes less than 2 seconds  Comments: BLE PVD discoloration    Neurological:      Mental Status: He is alert  He is disoriented  Sensory: No sensory deficit  Psychiatric:         Attention and Perception: Attention normal          Mood and Affect: Mood normal          Speech: Speech normal          Behavior: Behavior normal          Cognition and Memory: Cognition is impaired  Judgment: Judgment is inappropriate           Additional Data:     Labs:    Results from last 7 days   Lab Units 20  1315   WBC Thousand/uL 4 12*   HEMOGLOBIN g/dL 8 9*   HEMATOCRIT % 29 4*   PLATELETS Thousands/uL 102*   LYMPHO PCT % 30   MONO PCT % 18*   EOS PCT % 1     Results from last 7 days   Lab Units 20  1315   POTASSIUM mmol/L 4 4   CHLORIDE mmol/L 107   CO2 mmol/L 29   BUN mg/dL 28*   CREATININE mg/dL 0 87   CALCIUM mg/dL 8 8   ALK PHOS U/L 66   ALT U/L 19   AST U/L 24           * I Have Reviewed All Lab Data Listed Above  * Additional Pertinent Lab Tests Reviewed: All Labs Within Last 24 Hours Reviewed    Imaging:    Imaging Reports Reviewed Today Include: None available   Imaging Personally Reviewed by Myself Includes:  None     Recent Cultures (last 7 days):           Last 24 Hours Medication List:   Current Facility-Administered Medications   Medication Dose Route Frequency Provider Last Rate    acetaminophen  650 mg Oral Q6H PRN DORITA Cline      aspirin  81 mg Oral Daily Olaf Gordon MD      donepezil  10 mg Oral Daily Olaf Gordon MD      [START ON 9/11/2020] furosemide  20 mg Oral Daily DORITA Cline      heparin (porcine)  5,000 Units Subcutaneous Q8H [facility] Olaf Gordon MD      levothyroxine  50 mcg Oral Daily Olaf Gordon MD      melatonin  6 mg Oral HS DORITA Cline      memantine  10 mg Oral BID Olaf Gordon MD      ondansetron  4 mg Intravenous Q6H PRN DORITA Cline      pravastatin  80 mg Oral Daily With Brigida Ham MD      QUEtiapine  25 mg Oral BID PRN DORITA Cline      senna  1 tablet Oral HS Olaf Gordon MD          Today, Patient Was Seen By: DORITA Cline    ** Please Note: Dictation voice to text software may have been used in the creation of this document   **

## 2020-09-10 NOTE — PHYSICAL THERAPY NOTE
Physical Therapy Evaluation     Patient's Name: Ibeth Pearl    Admitting Diagnosis  Dementia ([facility] ) [F03 90]  Inappropriate sexual behavior [Z72 89]    Problem List  Patient Active Problem List   Diagnosis    Dementia with behavioral disturbance ([facility] )    Dysphagia    Hematuria    Hyperlipidemia    PVD (peripheral vascular disease) ([facility] )    Anemia    Mild protein-calorie malnutrition ([place] [address] )    Hypothyroidism    Atrial fibrillation [facility])       Past Medical History  Past Medical History:   Diagnosis Date    Dementia ([place] [address] )     Heart attack ([place] [address] )     Heart disease     Hyperlipidemia     Memory loss     Thyroid disease        Past Surgical History  Past Surgical History:   Procedure Laterality Date    CATARACT EXTRACTION      CORONARY ARTERY BYPASS GRAFT      quadruple    ND ESOPHAGOGASTRODUODENOSCOPY TRANSORAL DIAGNOSTIC N/A 2/5/2019    Procedure: ESOPHAGOGASTRODUODENOSCOPY (EGD); Surgeon: Victor Hugo Goldberg DO;  Location: [facility]; Service: Gastroenterology    TOTAL HIP ARTHROPLASTY            09/10/20 5906   Note Type   Note type Eval only   Pain Assessment   Pain Assessment Tool FLACC   Pain Rating: FLACC (Rest) - Face 0   Pain Rating: FLACC (Rest) - Legs 0   Pain Rating: FLACC (Rest) - Activity 0   Pain Rating: FLACC (Rest) - Cry 0   Pain Rating: FLACC (Rest) - Consolability 0   Score: FLACC (Rest) 0   Pain Rating: FLACC (Activity) - Face 0   Pain Rating: FLACC (Activity) - Legs 0   Pain Rating: FLACC (Activity) - Activity 0   Pain Rating: FLACC (Activity) - Cry 0   Pain Rating: FLACC (Activity) - Consolability 0   Score: FLACC (Activity) 0   Home Living   Type of Home Assisted living   Home Layout One level   Additional Comments Pt  unable to answer questions regarding PLOF/home set up      Prior Function   Level of [place] Needs assistance with ADLs and functional mobility   Lives With Facility staff   ADL Assistance Needs assistance   IADLs Needs assistance   Restrictions/Precautions   Other Precautions Fall Risk;Bed Alarm; Chair Alarm;Multiple lines;Telemetry;Cognitive   Cognition   Overall Cognitive Status Impaired   Arousal/Participation Alert   Orientation Level Oriented to person   Following Commands Follows one step commands with increased time or repetition   RLE Assessment   RLE Assessment   (grossly >3+  unable to further assess)   LLE Assessment   LLE Assessment   (grossly >3+  unable to further assess)   Bed Mobility   Supine to Sit 3  Moderate assistance   Additional items Assist x 1   Additional Comments Pt  left seated in recliner alarm intact , all needs in reach s/p session  Pt  with 1:1 at time of IE  Transfers   Sit to Stand 4  Minimal assistance   Additional items Increased time required;Assist x 2   Stand to Sit 4  Minimal assistance   Additional items Assist x 2; Increased time required;Verbal cues   Ambulation/Elevation   Gait pattern Decreased foot clearance; Short stride; Inconsistent abimael; Forward Flexion  (safety concerns with RW use, vc and tactile cues with safety)   Gait Assistance 4  Minimal assist   Additional items Assist x 2   Assistive Device Rolling walker   Distance 12'x1   Balance   Static Sitting Good   Dynamic Sitting Fair   Static Standing Fair -   Dynamic Standing Fair -   Ambulatory Poor +   Endurance Deficit   Endurance Deficit Yes   Activity Tolerance   Activity Tolerance Patient limited by fatigue  (cognition)   Nurse Made Aware RN ok to see   Assessment   Prognosis Fair   Problem List Decreased strength;Decreased endurance; Impaired balance;Decreased mobility; Decreased cognition;Decreased safety awareness   Assessment Pt is 80 y o  male seen for PT evaluation s/p admit to [facility] on 9/9/2020 w/ Dementia with behavioral disturbance ([facility] [place] [address] )  PT consulted to assess pt's functional mobility and d/c needs  Order placed for PT eval and tx, w/ activity order   Comorbidities affecting pt's physical performance at time of assessment include: dementia, PVD, Anemia, dysphagia, behavioral disturbance  PTA, pt was requiring A for mobility and Prior functional history limited due to cognitive deficits    Personal factors affecting pt at time of IE include: ambulating w/ assistive device, decreased cognition, inability to perform IADLs, inability to perform ADLs and inability to live alone  Please find objective findings from PT assessment regarding body systems outlined above with impairments and limitations including weakness, impaired balance, decreased endurance, gait deviations, decreased activity tolerance, decreased functional mobility tolerance, fall risk and decreased cognition  The following objective measures performed on IE also reveal limitations: Barthel Index: 40/100  Pt's clinical presentation is currently unstable/unpredictable seen in pt's presentation   Pt to benefit from continued PT tx to address deficits as defined above and maximize level of functional independent mobility and consistency  From PT/mobility standpoint, recommendation at time of d/c would be post acute rehabilitation pending progress in order to facilitate return to PLOF  Barriers to Discharge Decreased caregiver support; Other (Comment)  (decline functional mobility)   Goals   Patient Goals to sit up   STG Expiration Date 09/24/20   Short Term Goal #1 1  Pt will complete bed mobility with Mod I to increase functional mobility  2  Pt will complete sit to stand transfers with Mod I to increase functional mobility  3  Pt will ambulate 150 ft with RW with Mod I without LOB 4  Pt will increase B/L LE strength by 1 grade to facilitate improved functional mobility with decreased risk of falls  5  Pt will increase standing balance to fair in order to decrease risk of falls   PT Treatment Day 0   Plan   Treatment/Interventions Functional transfer training;LE strengthening/ROM; Therapeutic exercise; Endurance training;Bed mobility;Gait training;Patient/family training;Equipment eval/education   PT Frequency   (3-5x/wk)   Recommendation   PT Discharge Recommendation Post-Acute Rehabilitation Services   Equipment Recommended Walker   PT - OK to Discharge Yes   Additional Comments when medically stable   Barthel Index   Feeding 5   Bathing 0   Grooming Score 0   Dressing Score 5   Bladder Score 10   Bowels Score 10   Toilet Use Score 5   Transfers (Bed/Chair) Score 5   Mobility (Level Surface) Score 0   Stairs Score 0   Barthel Index Score 40         Nikunj Alfaro, PT

## 2020-09-10 NOTE — OCCUPATIONAL THERAPY NOTE
Occupational Therapy Evaluation Note        Patient Name: Aaron Mcpherson  [id number] Date: 9/10/2020       09/10/20 1135   Note Type   Note type Eval only   Restrictions/Precautions   Weight Bearing Precautions Per Order No   Other Precautions Chair Alarm; Bed Alarm;Cognitive; Fall Risk;Telemetry;Hard of hearing;1:1   Pain Assessment   Pain Assessment Tool FLACC   Pain Rating: FLACC (Rest) - Face 0   Pain Rating: FLACC (Rest) - Legs 0   Pain Rating: FLACC (Rest) - Activity 0   Pain Rating: FLACC (Rest) - Cry 0   Pain Rating: FLACC (Rest) - Consolability 0   Score: FLACC (Rest) 0   Pain Rating: FLACC (Activity) - Face 0   Pain Rating: FLACC (Activity) - Legs 0   Pain Rating: FLACC (Activity) - Activity 0   Pain Rating: FLACC (Activity) - Cry 0   Pain Rating: FLACC (Activity) - Consolability 0   Score: FLACC (Activity) 0   Home Living   Type of Home Assisted living  [facility])   Home Layout One level   Additional Comments Pt poor historian secondary to baseline cognitive deficits and unable to provide details about home setup and PLOF; CM to follow up   Prior Function   Level of [place] Needs assistance with IADLs; Needs assistance with ADLs and functional mobility   Lives With Facility staff   Receives Help From   (Facility staff)   ADL Assistance Needs assistance   IADLs Needs assistance   Comments Pt poor historian secondary to baseline cognitive deficits and unable to provide details about home setup and PLOF; CM to follow up   Lifestyle   Autonomy Patient is a poor historian at baseline secondary to cognitive deficits   Per chart review, patient was living at [facility] where facility staff assisted him with both ADLs and IADLs   Reciprocal Relationships Supportive facility staff   ADL   Eating Assistance 5  Supervision/Setup   Grooming Assistance 4  Minimal Assistance   UB Bathing Assistance 4  Minimal Assistance   LB [address] 2  Maximal [address] [facility] [address] LB Dressing Assistance 2  Maximal Assistance   Toileting Assistance  2  Maximal Assistance   Functional Assistance 2  Maximal Assistance   Additional Comments ADL assist levels based on pt's functional performance during OT evaluation   Bed Mobility   Supine to Sit 3  Moderate assistance   Additional items Assist x 1; Increased time required;Verbal cues;LE management   Additional Comments Pt received lying supine in bed upon OT arrival; at end of session: pt seated OOB to recliner chair w/ all needs within reach and 1:1 present in room  Chair alarm activated   Transfers   Sit to Stand 4  Minimal assistance   Additional items Assist x 2; Increased time required;Verbal cues   Stand to Sit 4  Minimal assistance   Additional items Assist x 2; Increased time required;Verbal cues   Additional Comments Performed w/ RW   Functional Mobility   Functional Mobility 4  Minimal assistance   Additional Comments x2   Additional items Rolling walker   Balance   Static Sitting Good   Dynamic Sitting Fair   Static Standing Fair -   Dynamic Standing Poor +   Ambulatory Poor +   Activity Tolerance   Activity Tolerance Patient limited by fatigue; Other (Comment)  (cognition)   Medical Staff Made Aware PT Ebb Mortimer   Nurse Made Aware RN confirmed pt appropriate for therapy    RUE Assessment   RUE Assessment WFL  (Strength and AROM grossly WFL based on formal assessment)   LUE Assessment   LUE Assessment WFL  (Strength and AROM grossly WFL based on formal assessment)   Hand Function   Gross Motor Coordination Functional   Fine Motor Coordination Impaired   Sensation   Light Touch   (Unable to assess)   Cognition   Overall Cognitive Status Impaired   Arousal/Participation Alert   Attention Attends with cues to redirect   Orientation Level Oriented to person   Memory Decreased short term memory;Decreased recall of recent events;Decreased recall of precautions   Following Commands Follows one step commands inconsistently   Comments Pt agreeable to OT evaluation   Assessment   Limitation Decreased ADL status; Decreased Safe judgement during ADL;Decreased cognition;Decreased endurance;Decreased self-care trans;Decreased high-level ADLs; Decreased fine motor control   Prognosis Guarded   Assessment Patient is a 80 y o  male seen for OT evaluation s/p admit to [id number] on 9/9/2020 w/Dementia with behavioral disturbance ([address] )  Commorbidities affecting patient's functional performance at time of assessment include: anemia, atrial fibrillation, mild protein-calorie malnutrition, hypothyroidism, PVD, and hyperlipidemia  Orders placed for OT evaluation and treatment  Performed at least two patient identifiers during session including name and wristband  Prior to admission, patient was living at [facility] where he received assistance from facility staff for ADLs and IADLs  Personal factors affecting patient at time of initial evaluation include: limited insight into deficits, difficulty performing ADLs and difficulty performing IADLs  Upon evaluation, patient requires moderate assist for UB ADLs, maximal assist for LB ADLs, transfers and functional ambulation in room and bathroom with minimal  assist x2, with the use of Rolling Walker  Patient is oriented to name,, disoriented to time,, disoriented to place, and disoriented to situation,, presents with limited ability to recall information after a brief period of time (short term memory) / working memory , acquire new concepts and behaviors (learning) and impaired judgement, inability to make safe decisions    Occupational performance is affected by the following deficits: orientation, impulsive behavior, degenerative arthritic joint changes, impaired fine motor coordination, dynamic sit/ stand balance deficit with poor standing tolerance time for self care and functional mobility, decreased activity tolerance, impaired memory, impaired problem solving, decreased safety awareness and postural control and postural alignment deficit, requiring external assistance to complete transitional movements  Therapist completed  extensive additional review of medical records and additional review of physical, cognitive or psychosocial history, clinical examination identifying 5 or more performance deficits, clinical decision making of a high complexity , consistent with a high complexity level evaluation  Patient to benefit from continued Occupational Therapy treatment while in the hospital to address deficits as defined above and maximize level of functional independence with ADLs and functional mobility  Occupational Performance areas to address include: eating, grooming , bathing/ shower, dressing, toilet hygiene, transfer to all surfaces, functional mobility, community mobility, emergency response, IADLs: safety procedures, Leisure Participation and Social participation  From OT standpoint, recommendation at time of d/c would be post-acute rehabilitation services  Goals   Patient Goals to sit up   Plan   Treatment Interventions ADL retraining;Functional transfer training; Endurance training;Patient/family training; Activityengagement;UE strengthening/ROM   Goal Expiration Date 09/24/20   OT Treatment Day 0   OT Frequency 3-5x/wk   Recommendation   OT Discharge Recommendation Post-Acute Rehabilitation Services   OT - OK to Discharge Yes  (once medically cleared)   Barthel Index   Feeding 5   Bathing 0   Grooming Score 0   Dressing Score 5   Bladder Score 10   Bowels Score 10   Toilet Use Score 5   Transfers (Bed/Chair) Score 5   Mobility (Level Surface) Score 0   Stairs Score 0   Barthel Index Score 40   Modified Swisher Scale   Modified Swisher Scale 4     Occupational Therapy Goals to be completed in 7-14 Days:    1- Patient will increase OOB/ sitting tolerance to 2-4 hours per day for increased participation in self care and leisure tasks with no s/s of exertion      2- Patient will increase standing tolerance time to 5 minutes with unilateral UE support to complete sink level ADLs@ min assist level  3- Patient will complete Interest checklist to explore participation in play/ leisure tasks for increased satisfaction and quality of life  4- Patient will increase sitting tolerance at edge of bed to 20 minutes to complete UB ADLs @ min assist level  5- Patient will transfer bed to Chair / toilet with supervision assist with AD as indicated  6- Patient will complete UB ADLs with minimal assist     7- Patient will complete LB ADLs with min assist while seated  8- Patient will complete toileting hygiene with supervision assist for thoroughness      9- Patient/ Family will demonstrate competency with [address], OTR/L

## 2020-09-10 NOTE — ASSESSMENT & PLAN NOTE
· Macrocytic anemia   · Hgb 10 4 today   · B12/folate and iron panel all WNL  · Homocysteine and MMA levels pending  · Trend CBC in am   · Pt also with pancytopenia, appears to be improving, likely reactive

## 2020-09-11 PROBLEM — E87.6 HYPOKALEMIA: Status: ACTIVE | Noted: 2020-09-11

## 2020-09-11 LAB
ALBUMIN SERPL BCP-MCNC: 3.1 G/DL (ref 3.5–5)
ALP SERPL-CCNC: 65 U/L (ref 46–116)
ALT SERPL W P-5'-P-CCNC: 22 U/L (ref 12–78)
ANION GAP SERPL CALCULATED.3IONS-SCNC: 8 MMOL/L (ref 4–13)
AST SERPL W P-5'-P-CCNC: 28 U/L (ref 5–45)
BILIRUB SERPL-MCNC: 0.8 MG/DL (ref 0.2–1)
BUN SERPL-MCNC: 19 MG/DL (ref 5–25)
CALCIUM SERPL-MCNC: 9.1 MG/DL (ref 8.3–10.1)
CHLORIDE SERPL-SCNC: 105 MMOL/L (ref 100–108)
CO2 SERPL-SCNC: 29 MMOL/L (ref 21–32)
CREAT SERPL-MCNC: 1.11 MG/DL (ref 0.6–1.3)
ERYTHROCYTE [DISTWIDTH] IN BLOOD BY AUTOMATED COUNT: 14.6 % (ref 11.6–15.1)
ERYTHROCYTE [SEDIMENTATION RATE] IN BLOOD: 45 MM/HOUR (ref 0–19)
GFR SERPL CREATININE-BSD FRML MDRD: 58 ML/MIN/1.73SQ M
GLUCOSE SERPL-MCNC: 92 MG/DL (ref 65–140)
HCT VFR BLD AUTO: 32.9 % (ref 36.5–49.3)
HCYS SERPL-SCNC: 12.7 UMOL/L (ref 5.3–14.2)
HGB BLD-MCNC: 10.4 G/DL (ref 12–17)
MAGNESIUM SERPL-MCNC: 2.3 MG/DL (ref 1.6–2.6)
MCH RBC QN AUTO: 35.9 PG (ref 26.8–34.3)
MCHC RBC AUTO-ENTMCNC: 31.6 G/DL (ref 31.4–37.4)
MCV RBC AUTO: 113 FL (ref 82–98)
NRBC BLD AUTO-RTO: 0 /100 WBCS
PLATELET # BLD AUTO: 113 THOUSANDS/UL (ref 149–390)
PMV BLD AUTO: 10.7 FL (ref 8.9–12.7)
POTASSIUM SERPL-SCNC: 3.4 MMOL/L (ref 3.5–5.3)
PROT SERPL-MCNC: 7.7 G/DL (ref 6.4–8.2)
RBC # BLD AUTO: 2.9 MILLION/UL (ref 3.88–5.62)
SODIUM SERPL-SCNC: 142 MMOL/L (ref 136–145)
T3FREE SERPL-MCNC: 1.5 PG/ML (ref 2.3–4.2)
T4 FREE SERPL-MCNC: 0.98 NG/DL (ref 0.76–1.46)
TSH SERPL DL<=0.05 MIU/L-ACNC: 10.67 UIU/ML (ref 0.36–3.74)
WBC # BLD AUTO: 4.11 THOUSAND/UL (ref 4.31–10.16)

## 2020-09-11 PROCEDURE — 85027 COMPLETE CBC AUTOMATED: CPT | Performed by: STUDENT IN AN ORGANIZED HEALTH CARE EDUCATION/TRAINING PROGRAM

## 2020-09-11 PROCEDURE — 84443 ASSAY THYROID STIM HORMONE: CPT | Performed by: NURSE PRACTITIONER

## 2020-09-11 PROCEDURE — 83090 ASSAY OF HOMOCYSTEINE: CPT | Performed by: NURSE PRACTITIONER

## 2020-09-11 PROCEDURE — 83918 ORGANIC ACIDS TOTAL QUANT: CPT | Performed by: NURSE PRACTITIONER

## 2020-09-11 PROCEDURE — 85652 RBC SED RATE AUTOMATED: CPT | Performed by: NURSE PRACTITIONER

## 2020-09-11 PROCEDURE — 80053 COMPREHEN METABOLIC PANEL: CPT | Performed by: STUDENT IN AN ORGANIZED HEALTH CARE EDUCATION/TRAINING PROGRAM

## 2020-09-11 PROCEDURE — 99232 SBSQ HOSP IP/OBS MODERATE 35: CPT | Performed by: PHYSICIAN ASSISTANT

## 2020-09-11 PROCEDURE — 84439 ASSAY OF FREE THYROXINE: CPT | Performed by: NURSE PRACTITIONER

## 2020-09-11 PROCEDURE — 84481 FREE ASSAY (FT-3): CPT | Performed by: NURSE PRACTITIONER

## 2020-09-11 PROCEDURE — 97110 THERAPEUTIC EXERCISES: CPT

## 2020-09-11 PROCEDURE — 83735 ASSAY OF MAGNESIUM: CPT | Performed by: STUDENT IN AN ORGANIZED HEALTH CARE EDUCATION/TRAINING PROGRAM

## 2020-09-11 RX ORDER — POTASSIUM CHLORIDE 20MEQ/15ML
20 LIQUID (ML) ORAL ONCE
Status: COMPLETED | OUTPATIENT
Start: 2020-09-11 | End: 2020-09-11

## 2020-09-11 RX ADMIN — HEPARIN SODIUM 5000 UNITS: 5000 INJECTION INTRAVENOUS; SUBCUTANEOUS at 05:05

## 2020-09-11 RX ADMIN — HEPARIN SODIUM 5000 UNITS: 5000 INJECTION INTRAVENOUS; SUBCUTANEOUS at 22:00

## 2020-09-11 RX ADMIN — QUETIAPINE FUMARATE 25 MG: 25 TABLET ORAL at 20:25

## 2020-09-11 RX ADMIN — MELATONIN 6 MG: 3 TAB ORAL at 22:00

## 2020-09-11 RX ADMIN — POTASSIUM CHLORIDE 20 MEQ: 20 SOLUTION ORAL at 20:25

## 2020-09-11 RX ADMIN — SENNOSIDES 8.6 MG: 8.6 TABLET, FILM COATED ORAL at 22:00

## 2020-09-11 RX ADMIN — DONEPEZIL HYDROCHLORIDE 10 MG: 5 TABLET ORAL at 17:55

## 2020-09-11 RX ADMIN — HEPARIN SODIUM 5000 UNITS: 5000 INJECTION INTRAVENOUS; SUBCUTANEOUS at 15:46

## 2020-09-11 RX ADMIN — MEMANTINE 10 MG: 10 TABLET ORAL at 17:56

## 2020-09-11 NOTE — CASE MANAGEMENT
CM contacted Donnie Woods from [facility] at [phone]  He reported that pt has been with them since Feb 26, 2019  He said that they have observed a drastic change of sexual overtness  Pt is very inappropriate with masturbating in front of others  He said that pt is very inappropriate with staff and residents  He said that pt ambulates and is encouraged to use his cane  He said that daughter, Sherrie Tomas is very supportive  However, he said that pt is not on any dementia or psych meds  He said that he feels that pt needs inpatient geripsych unit prior to coming back there  STR is recommended  CM contacted Sherrie Tomas at [phone] and left vm  Pt will need dementia unit  CM sent referrals to [facility] and [facility], [address] [place] [address] and [facility]

## 2020-09-11 NOTE — PHYSICAL THERAPY NOTE
09/11/20 0948   PT Last Visit   PT Visit Date 09/11/20   Pain Assessment   Pain Assessment Tool 0-10   Pain Score No Pain   Restrictions/Precautions   Weight Bearing Precautions Per Order No   Other Precautions Chair Alarm; Bed Alarm;Cognitive; Fall Risk;Hard of hearing   General   Chart Reviewed Yes   Response to Previous Treatment Patient unable to report, no changes reported from family or staff   Family/Caregiver Present No   Cognition   Overall Cognitive Status Impaired   Arousal/Participation Arousable;Lethargic   Attention Attends with cues to redirect   Orientation Level Oriented to person;Disoriented to place; Disoriented to time;Disoriented to situation   Memory Decreased short term memory;Decreased recall of recent events;Decreased recall of precautions   Following Commands Follows one step commands inconsistently   Comments pt agreeable to PT treatment with [employer]   Bed Mobility   Additional Comments pt recieved in bed, difficulty staying awake   Endurance Deficit   Endurance Deficit Yes   Endurance Deficit Description unable to stay awake during therapy session   Activity Tolerance   Activity Tolerance Patient limited by fatigue; Other (Comment)  (cognition)   Nurse Made Aware yes   Exercises   Quad Sets Supine;20 reps;AROM; Bilateral   Heelslides Supine;20 reps;AAROM; Bilateral   Hip Flexion Supine;20 reps;AROM; Bilateral  (SLR)   Hip Abduction Supine;20 reps;AAROM; Bilateral   Hip Adduction Supine;20 reps;AROM; Bilateral   Knee AROM Short Arc Quad Supine;20 reps;AROM; Bilateral   Ankle Pumps Supine;20 reps;AAROM;AROM; Bilateral   Assessment   Prognosis Fair   Problem List Decreased strength;Decreased endurance; Impaired balance;Decreased mobility; Decreased cognition;Decreased safety awareness   Assessment Pt seen for PT treatment session this date with interventions consisting of Therapeutic exercise consisting of: AROM and AAROM 20 reps B LE in supine position   Pt agreeable to PT treatment session upon arrival, pt found supine in bed w/ HOB elevated, in no apparent distress and difficulty staying awake  In comparison to previous session, pt with no improvements as evidenced by decreased alertness and cognition  Post session: pt returned BTB, all needs in reach and RN notified of session findings/recommendations Continue to recommend STR at time of d/c in order to maximize pt's functional independence and safety w/ mobility  Pt continues to be functioning below baseline level, and remains limited 2* factors listed above and including decreased functional activity tolerance and decreased functional mobility  PT will continue to see pt while here in order to address the deficits listed above and provide interventions consistent w/ POC in effort to achieve STGs  Barriers to Discharge Decreased caregiver support   Plan   Treatment/Interventions Functional transfer training;LE strengthening/ROM; Therapeutic exercise; Endurance training;Bed mobility;Gait training   Progress Slow progress, cognitive deficits   PT Frequency   (3-5x/wk)   Recommendation   PT Discharge Recommendation Post-Acute Rehabilitation Services   Equipment Recommended Walker   PT - OK to Discharge Yes  (when  medically stable , if to STR)   Additional Comments upon conclusion, pt resting in bed with all needs in place

## 2020-09-11 NOTE — PROGRESS NOTES
Progress Note - Maxi Mercado 10/24/1928, 80 y o  male MRN: [id number]    Unit/Bed#: -01 Encounter: [id number]    Primary Care Provider: Fredis So MD   Date and time admitted to hospital: 9/9/2020 12:03 PM      DOS: 9/11/2020    * Dementia with behavioral disturbance ([facility] [address] )  Assessment & Plan  · Resides in [facility]  Lately has been hypersexual and difficult to manage due to dementia at his current residence  Brought to ED for further evaluation of placement in dementia unit  · Likely dementia progression   · Psychiatry evaluated,  · Recommending continuation of 1:1, pt also in non-violent wrist restraints secondary to agitation today   · Has not showed any increased hypersexual behaviors here   · Unable to reach patient's daughter by multiple providers, will call [facility] tomorrow for more collateral info  · PT/OT recommending STR  · TSH 10 672, free T3/T4 pending   · Continue Aricept 10 mg daily and Memantine 10 mg BID, pt has been refusing these medications this morning   · CT head negative for acute intracranial abnormality   · Will start Seroquel 25 mg BID PRN for agitation   · Would recommend repeat psychiatry evaluation for possible inpatient collin psychiatry evaluation  · If there is concern for an acute change - consider obtaining an MRI brain to r/o CVA or mass  Low suspicion for any CVA at this time, appears to be secondary to underlying dementia    Hypokalemia  Assessment & Plan  · K+ 3 4 today  · Replete with 20 mEq potassium solution   · Monitor BMP in the AM  · Mag level WNL    Atrial fibrillation (HCC)  Assessment & Plan  · EKG showed a   Fib with slow ventricular response   · Stable from prior EKG  · HR appears stable    Hypothyroidism  Assessment & Plan  · TSH elevated  · Free T3/T4 pending   · Continue Levothyroxine 50 mcg daily pending results  · Pt will need repeat thyroid function testing in 4-6 weeks     Mild protein-calorie malnutrition ([facility] )  Assessment & Plan  Malnutrition Findings:         Clavicular prominence noted  BMI Findings: Body mass index is 23 67 kg/m²  As evidenced by muscle wasting  · Continue nutrition supplements     Anemia  Assessment & Plan  · Macrocytic anemia   · Hgb 10 4 today   · B12/folate and iron panel all WNL  · Homocysteine and MMA levels pending  · Trend CBC in am   · Pt also with pancytopenia, appears to be improving, likely reactive    PVD (peripheral vascular disease) (HCC)  Assessment & Plan  · Continue ASA, statin  · Stable    Hyperlipidemia  Assessment & Plan  · Continue statin       VTE Pharmacologic Prophylaxis:   Pharmacologic: Heparin  Mechanical VTE Prophylaxis in Place: Yes    Patient Centered Rounds: I have performed bedside rounds with nursing staff today  Discussions with Specialists or Other Care Team Provider:  Discussed with RN, cm and reviewed previous notes    Education and Discussions with Family / Patient: patient, however pt with underlying dementia  Attempted to call patient's daughter twice, VM left with call back number  Time Spent for Care: 30 minutes  More than 50% of total time spent on counseling and coordination of care as described above  Current Length of Stay: 2 day(s)    Current Patient Status: Inpatient   Certification Statement: The patient will continue to require additional inpatient hospital stay due to continued monitoring of mentation and agitation, psych re-evaluation    Discharge Plan: Not medically stable as above    Code Status: No Order      Subjective:   Patient is very somnolent, agitated when woken up  He is able to open his eyes to command  When patient woke up he yelled "what do you want!" then fell back to sleep  Ate about 25% of his lunch today per discussion with 1:1  Pt has not made any sexual gestures to any of the staff thus far on report      Objective:     Vitals:   Temp (24hrs), Av °F (36 1 °C), Min:96 9 °F (36 1 °C), Max:97 2 °F (36 2 °C)    Temp:  [96 9 °F (36 1 °C)-97 2 °F (36 2 °C)] 97 2 °F (36 2 °C)  HR:  [60] 60  Resp:  [18] 18  BP: (110-131)/(48-64) 131/64  SpO2:  [98 %] 98 %  Body mass index is 23 67 kg/m²  Input and Output Summary (last 24 hours): Intake/Output Summary (Last 24 hours) at 9/11/2020 1850  Last data filed at 9/11/2020 1300  Gross per 24 hour   Intake 320 ml   Output 215 ml   Net 105 ml       Physical Exam:     Physical Exam  Vitals signs reviewed  Constitutional:       General: He is not in acute distress  Appearance: He is not toxic-appearing  Comments: Pt is in no acute distress lying in his hospital bed resting comfortably  Somnolent, arousable to tactile stimuli  Agitated  Baseline dementia  HENT:      Head: Normocephalic and atraumatic  Eyes:      Conjunctiva/sclera: Conjunctivae normal       Pupils: Pupils are equal, round, and reactive to light  Cardiovascular:      Rate and Rhythm: Normal rate  Pulses: Normal pulses  Heart sounds: Normal heart sounds  Comments: Irregular rhythm  Pulmonary:      Effort: Pulmonary effort is normal  No respiratory distress  Breath sounds: Normal breath sounds  No wheezing or rales  Abdominal:      General: Abdomen is flat  Bowel sounds are normal  There is no distension  Tenderness: There is no abdominal tenderness  There is no guarding  Musculoskeletal:      Right lower leg: No edema  Left lower leg: No edema  Skin:     General: Skin is warm and dry  Findings: No erythema  Neurological:      Mental Status: He is alert           Additional Data:     Labs:    Results from last 7 days   Lab Units 09/11/20  0539 09/09/20  1315   WBC Thousand/uL 4 11* 4 12*   HEMOGLOBIN g/dL 10 4* 8 9*   HEMATOCRIT % 32 9* 29 4*   PLATELETS Thousands/uL 113* 102*   LYMPHO PCT %  --  30   MONO PCT %  --  18*   EOS PCT %  --  1     Results from last 7 days   Lab Units 09/11/20  0539   POTASSIUM mmol/L 3 4*   CHLORIDE mmol/L 105   CO2 mmol/L 29   BUN mg/dL 19 CREATININE mg/dL 1 11   CALCIUM mg/dL 9 1   ALK PHOS U/L 65   ALT U/L 22   AST U/L 28           * I Have Reviewed All Lab Data Listed Above  * Additional Pertinent Lab Tests Reviewed: All Labs Within Last 24 Hours Reviewed    Imaging:    Imaging Reports Reviewed Today Include: CT head  Imaging Personally Reviewed by Myself Includes:  None    Recent Cultures (last 7 days):           Last 24 Hours Medication List:   Current Facility-Administered Medications   Medication Dose Route Frequency Provider Last Rate    acetaminophen  650 mg Oral Q6H PRN DORITA Dunham      aspirin  81 mg Oral Daily Alta Restrepo MD      donepezil  10 mg Oral Daily Alta Restrepo MD      furosemide  20 mg Oral Daily DORITA Dunham      heparin (porcine)  5,000 Units Subcutaneous [facility] [address] Alta Restrepo MD      levothyroxine  50 mcg Oral Daily Alta Restrepo MD      melatonin  6 mg Oral HS DORITA Dunham      memantine  10 mg Oral BID Alta Restrepo MD      ondansetron  4 mg Intravenous Q6H PRN DORITA Dunham      potassium chloride  20 mEq Oral Once Tammi Hernandez PA-C      pravastatin  80 mg Oral Daily With Chuy Charles MD      QUEtiapine  25 mg Oral BID PRN DORITA Dunham      senna  1 tablet Oral HS Alta Restrepo MD          Today, Patient Was Seen By: Cinthia Liz PA-C    ** Please Note: Dictation voice to text software may have been used in the creation of this document   **

## 2020-09-12 LAB
ALBUMIN SERPL BCP-MCNC: 2.7 G/DL (ref 3.5–5)
ALP SERPL-CCNC: 57 U/L (ref 46–116)
ALT SERPL W P-5'-P-CCNC: 14 U/L (ref 12–78)
ANION GAP SERPL CALCULATED.3IONS-SCNC: 11 MMOL/L (ref 4–13)
AST SERPL W P-5'-P-CCNC: 25 U/L (ref 5–45)
ATRIAL RATE: 49 BPM
BASOPHILS # BLD MANUAL: 0 THOUSAND/UL (ref 0–0.1)
BASOPHILS NFR MAR MANUAL: 0 % (ref 0–1)
BILIRUB SERPL-MCNC: 0.6 MG/DL (ref 0.2–1)
BUN SERPL-MCNC: 16 MG/DL (ref 5–25)
CALCIUM SERPL-MCNC: 8.9 MG/DL (ref 8.3–10.1)
CHLORIDE SERPL-SCNC: 106 MMOL/L (ref 100–108)
CO2 SERPL-SCNC: 24 MMOL/L (ref 21–32)
CREAT SERPL-MCNC: 0.84 MG/DL (ref 0.6–1.3)
EOSINOPHIL # BLD MANUAL: 0.08 THOUSAND/UL (ref 0–0.4)
EOSINOPHIL NFR BLD MANUAL: 2 % (ref 0–6)
ERYTHROCYTE [DISTWIDTH] IN BLOOD BY AUTOMATED COUNT: 14.6 % (ref 11.6–15.1)
GFR SERPL CREATININE-BSD FRML MDRD: 77 ML/MIN/1.73SQ M
GLUCOSE SERPL-MCNC: 82 MG/DL (ref 65–140)
HCT VFR BLD AUTO: 30.5 % (ref 36.5–49.3)
HGB BLD-MCNC: 9.7 G/DL (ref 12–17)
LYMPHOCYTES # BLD AUTO: 1.23 THOUSAND/UL (ref 0.6–4.47)
LYMPHOCYTES # BLD AUTO: 32 % (ref 14–44)
MACROCYTES BLD QL AUTO: PRESENT
MAGNESIUM SERPL-MCNC: 2.3 MG/DL (ref 1.6–2.6)
MCH RBC QN AUTO: 35.9 PG (ref 26.8–34.3)
MCHC RBC AUTO-ENTMCNC: 31.8 G/DL (ref 31.4–37.4)
MCV RBC AUTO: 113 FL (ref 82–98)
MONOCYTES # BLD AUTO: 0.46 THOUSAND/UL (ref 0–1.22)
MONOCYTES NFR BLD: 12 % (ref 4–12)
MYELOCYTES NFR BLD MANUAL: 1 % (ref 0–1)
NEUTROPHILS # BLD MANUAL: 1.93 THOUSAND/UL (ref 1.85–7.62)
NEUTS SEG NFR BLD AUTO: 50 % (ref 43–75)
NRBC BLD AUTO-RTO: 0 /100 WBCS
PLATELET # BLD AUTO: 94 THOUSANDS/UL (ref 149–390)
PLATELET BLD QL SMEAR: ABNORMAL
PMV BLD AUTO: 11.1 FL (ref 8.9–12.7)
POTASSIUM SERPL-SCNC: 4.3 MMOL/L (ref 3.5–5.3)
PROT SERPL-MCNC: 7.1 G/DL (ref 6.4–8.2)
QRS AXIS: -50 DEGREES
QRSD INTERVAL: 118 MS
QT INTERVAL: 452 MS
QTC INTERVAL: 420 MS
RBC # BLD AUTO: 2.7 MILLION/UL (ref 3.88–5.62)
SODIUM SERPL-SCNC: 141 MMOL/L (ref 136–145)
T WAVE AXIS: 122 DEGREES
TOTAL CELLS COUNTED SPEC: 100
VARIANT LYMPHS # BLD AUTO: 3 %
VENTRICULAR RATE: 52 BPM
WBC # BLD AUTO: 3.85 THOUSAND/UL (ref 4.31–10.16)

## 2020-09-12 PROCEDURE — 93010 ELECTROCARDIOGRAM REPORT: CPT | Performed by: INTERNAL MEDICINE

## 2020-09-12 PROCEDURE — 80053 COMPREHEN METABOLIC PANEL: CPT | Performed by: STUDENT IN AN ORGANIZED HEALTH CARE EDUCATION/TRAINING PROGRAM

## 2020-09-12 PROCEDURE — 85027 COMPLETE CBC AUTOMATED: CPT | Performed by: STUDENT IN AN ORGANIZED HEALTH CARE EDUCATION/TRAINING PROGRAM

## 2020-09-12 PROCEDURE — 83735 ASSAY OF MAGNESIUM: CPT | Performed by: STUDENT IN AN ORGANIZED HEALTH CARE EDUCATION/TRAINING PROGRAM

## 2020-09-12 PROCEDURE — 99232 SBSQ HOSP IP/OBS MODERATE 35: CPT | Performed by: PHYSICIAN ASSISTANT

## 2020-09-12 PROCEDURE — 85007 BL SMEAR W/DIFF WBC COUNT: CPT | Performed by: STUDENT IN AN ORGANIZED HEALTH CARE EDUCATION/TRAINING PROGRAM

## 2020-09-12 RX ORDER — QUETIAPINE FUMARATE 25 MG/1
25 TABLET, FILM COATED ORAL
Status: DISCONTINUED | OUTPATIENT
Start: 2020-09-12 | End: 2020-09-14

## 2020-09-12 RX ORDER — HALOPERIDOL 5 MG/ML
1 INJECTION INTRAMUSCULAR ONCE
Status: COMPLETED | OUTPATIENT
Start: 2020-09-12 | End: 2020-09-12

## 2020-09-12 RX ORDER — QUETIAPINE FUMARATE 25 MG/1
12.5 TABLET, FILM COATED ORAL 2 TIMES DAILY
Status: DISCONTINUED | OUTPATIENT
Start: 2020-09-12 | End: 2020-09-16 | Stop reason: HOSPADM

## 2020-09-12 RX ADMIN — DONEPEZIL HYDROCHLORIDE 10 MG: 5 TABLET ORAL at 14:26

## 2020-09-12 RX ADMIN — HEPARIN SODIUM 5000 UNITS: 5000 INJECTION INTRAVENOUS; SUBCUTANEOUS at 05:38

## 2020-09-12 RX ADMIN — PRAVASTATIN SODIUM 80 MG: 80 TABLET ORAL at 17:19

## 2020-09-12 RX ADMIN — HEPARIN SODIUM 5000 UNITS: 5000 INJECTION INTRAVENOUS; SUBCUTANEOUS at 14:40

## 2020-09-12 RX ADMIN — SENNOSIDES 8.6 MG: 8.6 TABLET, FILM COATED ORAL at 21:20

## 2020-09-12 RX ADMIN — MELATONIN 6 MG: 3 TAB ORAL at 21:20

## 2020-09-12 RX ADMIN — HALOPERIDOL LACTATE 1 MG: 5 INJECTION, SOLUTION INTRAMUSCULAR at 12:44

## 2020-09-12 RX ADMIN — QUETIAPINE FUMARATE 12.5 MG: 25 TABLET ORAL at 21:20

## 2020-09-12 RX ADMIN — HEPARIN SODIUM 5000 UNITS: 5000 INJECTION INTRAVENOUS; SUBCUTANEOUS at 21:23

## 2020-09-12 RX ADMIN — MEMANTINE 10 MG: 10 TABLET ORAL at 14:26

## 2020-09-12 NOTE — ASSESSMENT & PLAN NOTE
· Macrocytic anemia   · Hgb 9 7 today   · B12/folate and iron panel all WNL  · Homocysteine level WNL  · MMA level pending   · Trend CBC in am   · Pt also with pancytopenia, likely reactive vs  chronic, no signs of acute bleeding noted

## 2020-09-12 NOTE — CASE MANAGEMENT
Pt was seen by psych and their recommendations were for a SNF w/dementia unit  CM placed referrals to [facility], [facility], and [facility]  Will discuss with family following acceptances

## 2020-09-12 NOTE — CONSULTS
Consultation - [address] 80 y o  male MRN: [id number]  Unit/Bed#: -01 Encounter: [id number]      Name: China Rodriguez     : 10/24/1928  Date: 2020      Time: 10:00AM  Location of patient: [facility]   Location of doctor: [facility] [address]  Length of consult: 30 min    This evaluation was conducted via telepsychiatry with the assistance of onsite staff    Inpatient consult to Psychiatry  Consult performed by: Bobette Dubin, DO  Consult ordered by: Fernandez Mejia PA-C      Impression/Risk Assessment: 80year old male with history of dementia and worsening behaviors at his nursing home, seen in follow up for possible collin-psych inpatient  He continues to exhibit dementia-related behavior issues, agitation, inability to follow directions, refusal to take medications, and at times requiring restraints  When he takes his medications, he is able to sleep and show some behavior  There is no history of SI or HI, psychosis, or drug use  Diagnosis: Neurocognitive disorder with behavioral disturbance (F02 81)  Treatment Recommendations: At this point, his next of kin should be involved to discuss patients baseline status, however it seems his daughter is difficult to get a hold of  His behaviors and agitation are related to his dementia, and all we can do is offer medication to help with his combativeness when they arise  I do not believe a collin-psychiatry inpatient would benefit this patient as there would not be any specific treatment, per se  He would benefit from continued orientation, familiarity with staff and the unit, and introduce things familiar to him whether it is his family or things from the nursing facility  He may require a higher level of nursing facility, such as one for dementia patients  Would continue monitoring his labs as appropriate to ensure he does not worsen due to infection, kidney injury, or other medical issues    I would continue to use Seroquel 25mg PRN for agitation  If Seroquel can be cut even further, may consider 12 5mg BID and 25mg HS  Would encourage sleep at night and engaging patient during the day to prevent him sleeping during the day and thus being restless and agitated at night  Observation level  1:1: Continue 1:1 for safety  Pharmacological: Continue Seroquel, Aricept and Namenda  Consider Seroquel 12 5mg BID + 25mg HS for better coverage throughout the day  It appears when he takes the medication he is able to sleep and show better behavior control  However at times he refuses medications and spits it up even when crushed  Therapy: Supportive therapy, CBT, DBT  Level of Care: Continue current status  I ask for SW to aid in placement in a dementia facility if feasible  Chief Complaint: Linda Lozano  Interim History: 80year old male who currently resides in [facility] in [address] [place] [address], diagnosed with dementia, but otherwise with no psychiatric illness, presented from facility for increase in inappropriate sexual behaviors and behavioral changes associated with dementia  Nursing reports he has had no sexually inappropriate behaviors while in hospital, but he continues to have episodes of agitation and poor impulse control  His behavior and sleep improves when he is cooperative with his medication, but he has episodes or refusing and spitting out his medication  He intermittently requires restraints, and was last in restraints overnight  He continues to be on a 1:1  He is currently sleeping, not in acute distress, only mumbling help me, somebody please help me  He was last seen by psychiatry 9/9/2020 who recommended Seroquel 25mg BIDPRN on top of his usual Aricept 10mg and Namenda 10mg  Per hospitalist, PT/OT recommending STR, and repeat psychiatric evaluation for possible collin-psychiatric inpatient          Collateral: Not available at this time  SI/attempts/Self harm: Unable to obtain due to patient mental status, no history of  HI/Violence: Unable to obtain due to patient mental status, no history of  Trauma history: Unable to obtain due to patient mental status, no history of  Sex/human trafficking: Unable to obtain due to patient mental status, no history of  Access to guns: None while at nursing facility  Legal: Unable to obtain due to patient mental status, no history of  Psychiatric History/Treatment History: Unable to obtain due to patient mental status, no history of  Drug/Alcohol History: Unable to obtain due to patient mental status, no history of  Medical History: Dementia, Hypokalemia, AFib, Hypothyroidism, Anemia, Malnutrition, PVD, HLD  Medications & Freq: Aspirin 81mg daily, Aricept 10mg daily, Furosemide 20mg daily, Levothyroxine 50mcg daily, Melatonin 5mg HS, Namenda 10mg daily, Pravastatin 80mg dinner, Senokot 8 6mg HS, Tylenol PRN, Zofran PRN, Seroquel 25mg BIDPRN agitation  Allergies: NKDA  Sleep: Was able to sleep through the night overnight  Family Psych History/History of suicide: Unable to obtain due to patient mental status, no history of  Social History: Resides at [address] [place] [address]   Relationship status:    Employment: Retired   Education: Unknown   Stressors: chronic medical issues and dementia   Strengths/supports: Access to care    Past Medical History:   Diagnosis Date    [facility]     [facility] ([facility] )     Heart disease     Hyperlipidemia     Memory loss     Thyroid disease        Medical Review Of Systems:  Review of Systems    Meds/Allergies   all current active meds have been reviewed  No Known Allergies    Objective   Vital signs in last 24 hours:  Temp:  [97 2 °F (36 2 °C)] 97 2 °F (36 2 °C)  HR:  [60] 60  Resp:  [16-18] 16  BP: (128-131)/(64-65) 128/65      Intake/Output Summary (Last 24 hours) at 9/12/2020 1049  Last data filed at 9/12/2020 1023  Gross per 24 hour   Intake 340 ml   Output 565 ml   Net -225 ml       Mental Status Exam:   Appearance and attire: hospital gown, lying in bed not in acute distress, sleepy  Attitude and behavior: Uncooperative, lying in bed, mumbling and not answering questions or addressing this provider  Speech: Mumbling to himself asking for help  Affect and mood: Affect irritable, mood unable to assess  Association and thought processes: Unable to assess  Thought content: no apparent SI or HI  Perception: no apparent hallucinations or paranoia  Sensorium, memory, and orientation: sleepy, unable to assess orientation or memory  Intellectual functioning: average  Insight and judgment: insight and judgment poor    Code Status: No Order  Advance Directive and Living Will:      Power of :    POLST:      Counseling / Coordination of Care  Total floor / unit time spent today 4 minutes  Greater than 50% of total time was spent with the patient and / or family counseling and / or coordination of care   A description of the counseling / coordination of care: discussed with RN      Dr Amos Clinton DO

## 2020-09-12 NOTE — PROGRESS NOTES
Progress Note - Pratik Second 10/24/1928, 80 y o  male MRN: [id number]    Unit/Bed#: -01 Encounter: [id number]    Primary Care Provider: Bibi Clement MD   Date and time admitted to hospital: 9/9/2020 12:03 PM      DOS: 9/12/2020    * Dementia with behavioral disturbance ([address] )  Assessment & Plan  · Resides in [facility]  Lately has been hypersexual and difficult to manage due to dementia at his current residence  Brought to ED for further evaluation of placement in dementia unit  · Likely dementia progression  · Discussed with patient's daughter over the phone today, reports that the patient is typically forgetful but pleasantly demented, however she has not been able to see him much secondary to [place]   · Psychiatry re-evaluated,  · Recommending continuation of 1:1, pt not requiring restraints at this time, s/p 1 x dose of IM haldol this afternoon as he was being combative   · Has not showed any increased hypersexual behaviors here   · PT/OT recommending STR, psych recommending SNF with dementia unit, does not believe he would benefit from inpatient geripsych admission  CM following for placement  · TSH 10 672, free T4 WNL, free T3 decreased    · Continue Aricept 10 mg daily and Memantine 10 mg BID  · CT head negative for acute intracranial abnormality   · Continue Seroquel 25 mg PRN QHS for agitation, psych recommending seroquel 12 5 mg BID for better day time coverage, will add to regimen starting today    · If there is concern for an acute change - consider obtaining an MRI brain to r/o CVA or mass  Low suspicion for any CVA at this time, appears to be secondary to underlying dementia    Hypokalemia  Assessment & Plan  · Resolved with repletion   · Monitor BMP in the AM  · Mag level WNL    Atrial fibrillation (HCC)  Assessment & Plan  · EKG showed a   Fib with slow ventricular response   · Stable from prior EKG  · HR appears stable    Hypothyroidism  Assessment & Plan  · TSH elevated  · Free T4 WNL, free T3 low  · Therefore would continue levothyroxine 50 mcg daily for now  · Pt will need repeat thyroid function testing in 4-6 weeks     Mild protein-calorie malnutrition ([address] )  Assessment & Plan  Malnutrition Findings:         Clavicular prominence noted  BMI Findings: Body mass index is 23 67 kg/m²  As evidenced by muscle wasting  · Continue nutrition supplements     Anemia  Assessment & Plan  · Macrocytic anemia   · Hgb 9 7 today   · B12/folate and iron panel all WNL  · Homocysteine level WNL  · MMA level pending   · Trend CBC in am   · Pt also with pancytopenia, likely reactive vs  chronic, no signs of acute bleeding noted    PVD (peripheral vascular disease) (HCC)  Assessment & Plan  · Continue ASA, statin  · Stable    Hyperlipidemia  Assessment & Plan  · Continue statin       VTE Pharmacologic Prophylaxis:   Pharmacologic: Heparin  Mechanical VTE Prophylaxis in Place: Yes    Patient Centered Rounds: I have evaluated patient without nursing staff present due to speaking to nurse outside patient's room, [address] with Specialists or Other Care Team Provider: Discussed with RN, STEPHEN and reviewed previous notes     Education and Discussions with Family / Patient: Discussed with patient and patient's daughter over the phone regarding plan of care  Time Spent for Care: 20 minutes  More than 50% of total time spent on counseling and coordination of care as described above  Current Length of Stay: 3 day(s)    Current Patient Status: Inpatient   Certification Statement: The patient will continue to require additional inpatient hospital stay due to improvement in overall mentation and aggression, adjustment of mediations, safe discharge planning    Discharge Plan: Not medically stable as above, STEPHEN following for placement with dementia unit     Code Status: Level 3 - DNAR and DNI      Subjective:   Pt reports that he "wants to leave" here  He denies any pain   Appears more calm at this point, received IM haldol a few hours ago  More alert than yesterday, but continues to be confused and disoriented  Easily agitated  Asking for some water  Objective:     Vitals:   Temp (24hrs), Av 7 °F (36 5 °C), Min:97 7 °F (36 5 °C), Max:97 7 °F (36 5 °C)    Temp:  [97 7 °F (36 5 °C)] 97 7 °F (36 5 °C)  Resp:  [16-17] 17  BP: (128-133)/(65-67) 133/67  Body mass index is 23 67 kg/m²  Input and Output Summary (last 24 hours): Intake/Output Summary (Last 24 hours) at 2020 1907  Last data filed at 2020 1833  Gross per 24 hour   Intake 220 ml   Output 700 ml   Net -480 ml       Physical Exam:     Physical Exam  Vitals signs reviewed  Constitutional:       General: He is not in acute distress  Appearance: He is not toxic-appearing  Comments: Pt is in no acute distress lying in his hospital bed resting comfortably  Baseline dementia, intermittent agitation  Disoriented and confused  HENT:      Head: Normocephalic and atraumatic  Eyes:      Conjunctiva/sclera: Conjunctivae normal       Pupils: Pupils are equal, round, and reactive to light  Cardiovascular:      Rate and Rhythm: Normal rate  Heart sounds: Normal heart sounds  Comments: Irregular rhythm  Pulmonary:      Effort: Pulmonary effort is normal  No respiratory distress  Breath sounds: Normal breath sounds  No wheezing or rales  Abdominal:      General: Abdomen is flat  Bowel sounds are normal  There is no distension  Palpations: Abdomen is soft  Tenderness: There is no abdominal tenderness  There is no guarding  Musculoskeletal:         General: No swelling  Right lower leg: No edema  Left lower leg: No edema  Skin:     General: Skin is warm and dry  Findings: No erythema  Neurological:      Mental Status: He is alert           Additional Data:     Labs:    Results from last 7 days   Lab Units 20  0627   WBC Thousand/uL 3 85*   HEMOGLOBIN g/dL 9 7* HEMATOCRIT % 30 5*   PLATELETS Thousands/uL 94*   LYMPHO PCT % 32   MONO PCT % 12   EOS PCT % 2     Results from last 7 days   Lab Units 09/12/20  0627   POTASSIUM mmol/L 4 3   CHLORIDE mmol/L 106   CO2 mmol/L 24   BUN mg/dL 16   CREATININE mg/dL 0 84   CALCIUM mg/dL 8 9   ALK PHOS U/L 57   ALT U/L 14   AST U/L 25           * I Have Reviewed All Lab Data Listed Above  * Additional Pertinent Lab Tests Reviewed: All Labs Within Last 24 Hours Reviewed    Imaging:    Imaging Reports Reviewed Today Include: None  Imaging Personally Reviewed by Myself Includes:  None    Recent Cultures (last 7 days):           Last 24 Hours Medication List:   Current Facility-Administered Medications   Medication Dose Route Frequency Provider Last Rate    acetaminophen  650 mg Oral Q6H PRN Philip Breech, CRNP      aspirin  81 mg Oral Daily Pool Ramirez MD      donepezil  10 mg Oral Daily Pool Ramirez MD      furosemide  20 mg Oral Daily Philip Breech, CRNP      heparin (porcine)  5,000 Units Subcutaneous [facility] Pool Ramirez MD      levothyroxine  50 mcg Oral Daily Pool Ramirez MD      melatonin  6 mg Oral HS Philip Breech, CRNP      memantine  10 mg Oral BID Pool Ramirez MD      ondansetron  4 mg Intravenous Q6H PRN Philip Simmonsech, CRNP      pravastatin  80 mg Oral Daily With Shara Sears MD      QUEtiapine  12 5 mg Oral BID Tammi Hernandez PA-C      QUEtiapine  25 mg Oral HS PRN Sommer Garcia PA-C      senna  1 tablet Oral HS Pool Ramirez MD          Today, Patient Was Seen By: Sommer Garcia PA-C    ** Please Note: Dictation voice to text software may have been used in the creation of this document   **

## 2020-09-12 NOTE — ASSESSMENT & PLAN NOTE
· Resides in [facility]  Lately has been hypersexual and difficult to manage due to dementia at his current residence  Brought to ED for further evaluation of placement in dementia unit  · Likely dementia progression  · Discussed with patient's daughter over the phone today, reports that the patient is typically forgetful but pleasantly demented, however she has not been able to see him much secondary to [place]   · Psychiatry re-evaluated,  · Recommending continuation of 1:1, pt not requiring restraints at this time, s/p 1 x dose of IM haldol this afternoon as he was being combative   · Has not showed any increased hypersexual behaviors here   · PT/OT recommending STR, psych recommending SNF with dementia unit, does not believe he would benefit from inpatient geripsych admission  CM following for placement  · TSH 10 672, free T4 WNL, free T3 decreased    · Continue Aricept 10 mg daily and Memantine 10 mg BID  · CT head negative for acute intracranial abnormality   · Continue Seroquel 25 mg PRN QHS for agitation, psych recommending seroquel 12 5 mg BID for better day time coverage, will add to regimen starting today    · If there is concern for an acute change - consider obtaining an MRI brain to r/o CVA or mass   Low suspicion for any CVA at this time, appears to be secondary to underlying dementia

## 2020-09-12 NOTE — ASSESSMENT & PLAN NOTE
· TSH elevated  · Free T4 WNL, free T3 low  · Therefore would continue levothyroxine 50 mcg daily for now  · Pt will need repeat thyroid function testing in 4-6 weeks

## 2020-09-13 PROBLEM — M54.2 NECK PAIN: Status: ACTIVE | Noted: 2020-09-13

## 2020-09-13 PROCEDURE — 99232 SBSQ HOSP IP/OBS MODERATE 35: CPT | Performed by: PHYSICIAN ASSISTANT

## 2020-09-13 RX ADMIN — QUETIAPINE FUMARATE 12.5 MG: 25 TABLET ORAL at 11:01

## 2020-09-13 RX ADMIN — LEVOTHYROXINE SODIUM 50 MCG: 50 TABLET ORAL at 11:07

## 2020-09-13 RX ADMIN — HEPARIN SODIUM 5000 UNITS: 5000 INJECTION INTRAVENOUS; SUBCUTANEOUS at 14:54

## 2020-09-13 RX ADMIN — QUETIAPINE FUMARATE 25 MG: 25 TABLET ORAL at 03:12

## 2020-09-13 RX ADMIN — DONEPEZIL HYDROCHLORIDE 10 MG: 5 TABLET ORAL at 11:08

## 2020-09-13 RX ADMIN — HEPARIN SODIUM 5000 UNITS: 5000 INJECTION INTRAVENOUS; SUBCUTANEOUS at 05:39

## 2020-09-13 RX ADMIN — FUROSEMIDE 20 MG: 20 TABLET ORAL at 11:08

## 2020-09-13 RX ADMIN — MEMANTINE 10 MG: 10 TABLET ORAL at 11:08

## 2020-09-13 RX ADMIN — SENNOSIDES 8.6 MG: 8.6 TABLET, FILM COATED ORAL at 23:00

## 2020-09-13 RX ADMIN — HEPARIN SODIUM 5000 UNITS: 5000 INJECTION INTRAVENOUS; SUBCUTANEOUS at 23:00

## 2020-09-13 RX ADMIN — ACETAMINOPHEN 650 MG: 325 TABLET, FILM COATED ORAL at 15:57

## 2020-09-13 RX ADMIN — ASPIRIN 81 MG 81 MG: 81 TABLET ORAL at 11:08

## 2020-09-13 RX ADMIN — MELATONIN 6 MG: 3 TAB ORAL at 23:00

## 2020-09-13 RX ADMIN — QUETIAPINE FUMARATE 12.5 MG: 25 TABLET ORAL at 20:25

## 2020-09-13 NOTE — ASSESSMENT & PLAN NOTE
· EKG showed a   Fib with slow ventricular response   · Stable from prior EKG  · HR appears stable, mild bradycardia this morning, monitor

## 2020-09-13 NOTE — PROGRESS NOTES
Progress Note - Francois Gagnon 10/24/1928, 80 y o  male MRN: [id number]    Unit/Bed#: -01 Encounter: [id number]    Primary Care Provider: Paulo Castillo MD   Date and time admitted to hospital: 9/9/2020 12:03 PM      DOS: 9/13/2020    * Dementia with behavioral disturbance ([address] )  Assessment & Plan  · Resides in [facility]  Lately has been hypersexual and difficult to manage due to dementia at his current residence  Brought to ED for further evaluation of placement in dementia unit  · Likely dementia progression, CT on admission negative for intra-cranial abnormality   · Discussed with patient's daughter over the phone, reports that the patient is typically forgetful but pleasantly demented, however she has not been able to see him much secondary to [place]   · Psychiatry re-evaluated,  · Recommending continuation of 1:1, pt not requiring restraints at this time, mild improvement noted  · Has not showed any increased hypersexual behaviors here   · PT/OT recommending STR, psych recommending SNF with dementia unit, does not believe he would benefit from inpatient geripsych admission  CM following for placement  · Continue Aricept 10 mg daily and Memantine 10 mg BID  · Continue Seroquel 25 mg PRN QHS for agitation, psych recommending seroquel 12 5 mg BID for better day time coverage  · If there is concern for an acute change - consider obtaining an MRI brain to r/o CVA or mass   Low suspicion for any CVA at this time, appears to be secondary to underlying dementia process    Neck pain  Assessment & Plan  · Pt reporting some new onset neck pain today, however due to dementia unable to follow much command   · Does appear to have appropriately ROM  · No significant tenderness to palpation noted on exam   · Could have been positional as pt has been resting in bed   · Monitor closely, if persistent or worsening would recommend imaging     Hypokalemia  Assessment & Plan  · Resolved with repletion · Trend BMP  · Mag level WNL    Atrial fibrillation (HCC)  Assessment & Plan  · EKG showed a  Fib with slow ventricular response   · Stable from prior EKG  · HR appears stable, mild bradycardia this morning, monitor     Hypothyroidism  Assessment & Plan  · TSH elevated  · Free T4 WNL, free T3 low  · Therefore would continue levothyroxine 50 mcg daily for now  · Pt will need repeat thyroid function testing in 4-6 weeks     Mild protein-calorie malnutrition (HCC)  Assessment & Plan  Malnutrition Findings:         Clavicular prominence noted  BMI Findings: Body mass index is 23 67 kg/m²  As evidenced by muscle wasting  · Continue nutrition supplements     Anemia  Assessment & Plan  · Macrocytic anemia   · Hgb 9 7   · B12/folate and iron panel all WNL  · Homocysteine level WNL  · MMA level pending   · Trend CBC in am   · Pt also with pancytopenia, likely reactive vs  chronic, no signs of acute bleeding noted    PVD (peripheral vascular disease) ([facility])  Assessment & Plan  · Continue ASA, statin  · Stable    Hyperlipidemia  Assessment & Plan  · Continue statin     VTE Pharmacologic Prophylaxis:   Pharmacologic: Heparin  Mechanical VTE Prophylaxis in Place: No    Patient Centered Rounds: I have performed bedside rounds with nursing staff today  Janee Carrasco     Discussions with Specialists or Other Care Team Provider: Discussed with RN, CM and reviewed previous notes     Education and Discussions with Family / Patient: Discussed with patient at bedside, due to underlying dementia, reached out to patient's daughter Aruna Samayoa, unable to reach at both numbers, VM left with call back number  Time Spent for Care: 20 minutes  More than 50% of total time spent on counseling and coordination of care as described above      Current Length of Stay: 4 day(s)    Current Patient Status: Inpatient   Certification Statement: The patient will continue to require additional inpatient hospital stay due to awaiting placement, continued improvement of mental status    Discharge Plan: Not medically stable at this time, will need placement on discharge     Code Status: Level 3 - DNAR and DNI      Subjective:   Pt appears to be more alert today, confabulating but more talkative  Is complaining of some neck pain, however could be positional as patient has been in bed since admission  He does not follow simple commands, difficult to evaluate, but he does not have any pain to palpation  Objective:     Vitals:   Temp (24hrs), Av 5 °F (36 4 °C), Min:97 2 °F (36 2 °C), Max:97 7 °F (36 5 °C)    Temp:  [97 2 °F (36 2 °C)-97 7 °F (36 5 °C)] 97 2 °F (36 2 °C)  HR:  [44-55] 44  Resp:  [17-18] 17  BP: (101-133)/(51-67) 127/51  SpO2:  [98 %] 98 %  Body mass index is 23 67 kg/m²  Input and Output Summary (last 24 hours): Intake/Output Summary (Last 24 hours) at 2020 1136  Last data filed at 2020 0827  Gross per 24 hour   Intake 120 ml   Output 550 ml   Net -430 ml       Physical Exam:     Physical Exam  Vitals signs reviewed  Constitutional:       General: He is not in acute distress  Appearance: He is not toxic-appearing  Comments: Pt is in no acute distress lying in his hospital bed resting comfortably  Currently calm and cooperative, baseline confusion  Confabulation   HENT:      Head: Normocephalic and atraumatic  Eyes:      Conjunctiva/sclera: Conjunctivae normal       Pupils: Pupils are equal, round, and reactive to light  Neck:      Musculoskeletal: No neck rigidity or muscular tenderness  Comments: Unable to fully assess ROM as patient does not follow commands due to confusion, however no stiffness noted  Cardiovascular:      Rate and Rhythm: Normal rate  Pulses: Normal pulses  Comments: Irregular rhythm  Pulmonary:      Effort: Pulmonary effort is normal  No respiratory distress  Breath sounds: Normal breath sounds  No wheezing or rales  Abdominal:      General: Abdomen is flat   Bowel sounds are normal  There is no distension  Palpations: Abdomen is soft  Tenderness: There is no abdominal tenderness  There is no guarding  Musculoskeletal:         General: No swelling  Right lower leg: No edema  Left lower leg: No edema  Lymphadenopathy:      Cervical: No cervical adenopathy  Skin:     General: Skin is warm and dry  Findings: No erythema  Neurological:      Mental Status: He is alert  Comments: Baseline dementia, disoriented         Additional Data:     Labs:    Results from last 7 days   Lab Units 09/12/20  0627   WBC Thousand/uL 3 85*   HEMOGLOBIN g/dL 9 7*   HEMATOCRIT % 30 5*   PLATELETS Thousands/uL 94*   LYMPHO PCT % 32   MONO PCT % 12   EOS PCT % 2     Results from last 7 days   Lab Units 09/12/20  0627   POTASSIUM mmol/L 4 3   CHLORIDE mmol/L 106   CO2 mmol/L 24   BUN mg/dL 16   CREATININE mg/dL 0 84   CALCIUM mg/dL 8 9   ALK PHOS U/L 57   ALT U/L 14   AST U/L 25           * I Have Reviewed All Lab Data Listed Above  * Additional Pertinent Lab Tests Reviewed:  All Labs Within Last 24 Hours Reviewed    Imaging:    Imaging Reports Reviewed Today Include: CT head   Imaging Personally Reviewed by Myself Includes:  None    Recent Cultures (last 7 days):           Last 24 Hours Medication List:   Current Facility-Administered Medications   Medication Dose Route Frequency Provider Last Rate    acetaminophen  650 mg Oral Q6H PRN Igor Shines, CRNP      aspirin  81 mg Oral Daily Carol Martinez MD      donepezil  10 mg Oral Daily Carol Martinez MD      furosemide  20 mg Oral Daily Igor Shines, CRNP      heparin (porcine)  5,000 Units Subcutaneous [facility] Carol Martinez MD      levothyroxine  50 mcg Oral Daily Carol Martinez MD      melatonin  6 mg Oral HS Igor Shines, CRNP      memantine  10 mg Oral BID Carol Martinez MD      ondansetron  4 mg Intravenous Q6H PRN Igor Shines, CRNP      pravastatin  80 mg Oral Daily With [employer] Millie Roach MD      QUEtiapine  12 5 mg Oral BID Elver Martinez PA-C      QUEtiapine  25 mg Oral HS PRN Elver Martinez PA-C      senna  1 tablet Oral HS Lorene Lee MD          Today, Patient Was Seen By: Elver Martinez PA-C    ** Please Note: Dictation voice to text software may have been used in the creation of this document   **

## 2020-09-13 NOTE — ASSESSMENT & PLAN NOTE
· Resides in [facility]  Lately has been hypersexual and difficult to manage due to dementia at his current residence  Brought to ED for further evaluation of placement in dementia unit  · Likely dementia progression, CT on admission negative for intra-cranial abnormality   · Discussed with patient's daughter over the phone, reports that the patient is typically forgetful but pleasantly demented, however she has not been able to see him much secondary to [place]   · Psychiatry re-evaluated,  · Recommending continuation of 1:1, pt not requiring restraints at this time, mild improvement noted  · Has not showed any increased hypersexual behaviors here   · PT/OT recommending STR, psych recommending SNF with dementia unit, does not believe he would benefit from inpatient geripsych admission  CM following for placement  · Continue Aricept 10 mg daily and Memantine 10 mg BID  · Continue Seroquel 25 mg PRN QHS for agitation, psych recommending seroquel 12 5 mg BID for better day time coverage  · If there is concern for an acute change - consider obtaining an MRI brain to r/o CVA or mass   Low suspicion for any CVA at this time, appears to be secondary to underlying dementia process

## 2020-09-13 NOTE — ASSESSMENT & PLAN NOTE
· Pt reporting some new onset neck pain today, however due to dementia unable to follow much command   · Does appear to have appropriately ROM  · No significant tenderness to palpation noted on exam   · Could have been positional as pt has been resting in bed   · Monitor closely, if persistent or worsening would recommend imaging

## 2020-09-13 NOTE — ASSESSMENT & PLAN NOTE
· Macrocytic anemia   · Hgb 9 7   · B12/folate and iron panel all WNL  · Homocysteine level WNL  · MMA level pending   · Trend CBC in am   · Pt also with pancytopenia, likely reactive vs  chronic, no signs of acute bleeding noted

## 2020-09-13 NOTE — PLAN OF CARE
Problem: Potential for Falls  Goal: Patient will remain free of falls  Description: INTERVENTIONS:  - Assess patient frequently for physical needs  -  Identify cognitive and physical deficits and behaviors that affect risk of falls    -  [place] fall precautions as indicated by assessment   - Educate patient/family on patient safety including physical limitations  - Instruct patient to call for assistance with activity based on assessment  - Modify environment to reduce risk of injury  - Consider OT/PT consult to assist with strengthening/mobility  Outcome: Progressing

## 2020-09-14 ENCOUNTER — APPOINTMENT (INPATIENT)
Dept: CT IMAGING | Facility: HOSPITAL | Age: 85
DRG: 884 | End: 2020-09-14
Payer: MEDICARE

## 2020-09-14 LAB
BASOPHILS # BLD AUTO: 0.03 THOUSANDS/ΜL (ref 0–0.1)
BASOPHILS NFR BLD AUTO: 0 % (ref 0–1)
EOSINOPHIL # BLD AUTO: 0 THOUSAND/ΜL (ref 0–0.61)
EOSINOPHIL NFR BLD AUTO: 0 % (ref 0–6)
ERYTHROCYTE [DISTWIDTH] IN BLOOD BY AUTOMATED COUNT: 14.3 % (ref 11.6–15.1)
HCT VFR BLD AUTO: 34 % (ref 36.5–49.3)
HGB BLD-MCNC: 10.5 G/DL (ref 12–17)
IMM GRANULOCYTES # BLD AUTO: 0.14 THOUSAND/UL (ref 0–0.2)
IMM GRANULOCYTES NFR BLD AUTO: 2 % (ref 0–2)
LYMPHOCYTES # BLD AUTO: 0.71 THOUSANDS/ΜL (ref 0.6–4.47)
LYMPHOCYTES NFR BLD AUTO: 8 % (ref 14–44)
MCH RBC QN AUTO: 35 PG (ref 26.8–34.3)
MCHC RBC AUTO-ENTMCNC: 30.9 G/DL (ref 31.4–37.4)
MCV RBC AUTO: 113 FL (ref 82–98)
MONOCYTES # BLD AUTO: 1.9 THOUSAND/ΜL (ref 0.17–1.22)
MONOCYTES NFR BLD AUTO: 22 % (ref 4–12)
NEUTROPHILS # BLD AUTO: 6.05 THOUSANDS/ΜL (ref 1.85–7.62)
NEUTS SEG NFR BLD AUTO: 68 % (ref 43–75)
NRBC BLD AUTO-RTO: 0 /100 WBCS
PLATELET # BLD AUTO: 104 THOUSANDS/UL (ref 149–390)
PMV BLD AUTO: 11.7 FL (ref 8.9–12.7)
RBC # BLD AUTO: 3 MILLION/UL (ref 3.88–5.62)
WBC # BLD AUTO: 8.83 THOUSAND/UL (ref 4.31–10.16)

## 2020-09-14 PROCEDURE — 85025 COMPLETE CBC W/AUTO DIFF WBC: CPT | Performed by: PHYSICIAN ASSISTANT

## 2020-09-14 PROCEDURE — G1004 CDSM NDSC: HCPCS

## 2020-09-14 PROCEDURE — 99232 SBSQ HOSP IP/OBS MODERATE 35: CPT | Performed by: PHYSICIAN ASSISTANT

## 2020-09-14 PROCEDURE — 72125 CT NECK SPINE W/O DYE: CPT

## 2020-09-14 RX ORDER — LIDOCAINE 50 MG/G
1 PATCH TOPICAL DAILY
Status: DISCONTINUED | OUTPATIENT
Start: 2020-09-14 | End: 2020-09-16 | Stop reason: HOSPADM

## 2020-09-14 RX ADMIN — MELATONIN 6 MG: 3 TAB ORAL at 23:07

## 2020-09-14 RX ADMIN — HEPARIN SODIUM 5000 UNITS: 5000 INJECTION INTRAVENOUS; SUBCUTANEOUS at 14:31

## 2020-09-14 RX ADMIN — LEVOTHYROXINE SODIUM 50 MCG: 50 TABLET ORAL at 09:57

## 2020-09-14 RX ADMIN — QUETIAPINE FUMARATE 12.5 MG: 25 TABLET ORAL at 09:55

## 2020-09-14 RX ADMIN — PRAVASTATIN SODIUM 80 MG: 80 TABLET ORAL at 17:39

## 2020-09-14 RX ADMIN — HEPARIN SODIUM 5000 UNITS: 5000 INJECTION INTRAVENOUS; SUBCUTANEOUS at 07:00

## 2020-09-14 RX ADMIN — MEMANTINE 10 MG: 10 TABLET ORAL at 17:38

## 2020-09-14 RX ADMIN — SENNOSIDES 8.6 MG: 8.6 TABLET, FILM COATED ORAL at 23:06

## 2020-09-14 RX ADMIN — HEPARIN SODIUM 5000 UNITS: 5000 INJECTION INTRAVENOUS; SUBCUTANEOUS at 23:06

## 2020-09-14 RX ADMIN — FUROSEMIDE 20 MG: 20 TABLET ORAL at 09:57

## 2020-09-14 RX ADMIN — QUETIAPINE FUMARATE 12.5 MG: 25 TABLET ORAL at 17:39

## 2020-09-14 RX ADMIN — ASPIRIN 81 MG 81 MG: 81 TABLET ORAL at 09:57

## 2020-09-14 RX ADMIN — DONEPEZIL HYDROCHLORIDE 10 MG: 5 TABLET ORAL at 09:51

## 2020-09-14 RX ADMIN — LIDOCAINE 5% 1 PATCH: 700 PATCH TOPICAL at 17:46

## 2020-09-14 RX ADMIN — QUETIAPINE FUMARATE 25 MG: 25 TABLET ORAL at 01:11

## 2020-09-14 RX ADMIN — MEMANTINE 10 MG: 10 TABLET ORAL at 09:55

## 2020-09-14 NOTE — CASE MANAGEMENT
STEPHEN received a call from [employer] stating that she spoke with Chinyere Yates at [facility] and was informed that pt can return back there  CM informed her that CM will contact Giselle Orourke and will let her know how the conversation goes  However, pt would benefit from STR at this time prior to hopefully going back to [facility]  STEPHEN spoke with Giselle Orourke to discuss and he is in agreement that pt needs STR at this time  He is aware that pt may be going to [facility] [address]  STEPHEN called Jada Elena back and explained that it would be in the best interest at this time that pt goes to STR  She is agreeable to this but said that she just wanted to clarify

## 2020-09-14 NOTE — CASE MANAGEMENT
STEPHEN called Laura to inform her that pt's daughter is agreeable for pt to go to one of their facilities  She said that pt would go into [facility] in [place]  However, prior to acceptance, she would like to review notes regarding if pt is still having sexual behaviors  STEPHEN communicated with SLIM and RN  Pt not showing any sexual inappropriateness at this time  Pt will also need the 1:1 removed for 24 hrs  SLIM made aware  CM called pt's daughter and left a vm providing her with updates

## 2020-09-14 NOTE — ASSESSMENT & PLAN NOTE
· Pt reporting some right sided neck pain x 24 hours, however due to dementia unable to follow much command   · Mild tenderness to palpation of the right occiput today   · CT c-spine obtained with mild to moderate spondylosis, no additional findings   · Could be positional as pt has been resting in bed   · Monitor closely  · Continue PT/OT

## 2020-09-14 NOTE — PROGRESS NOTES
09/14/20 from start of shift 0700 until now 2:09 PM patient has been generally calm and sleepy  He awakens periodically during care and for oral intake  Mild behavior outbursts of spitting when oral intake is not pleasing and swatting when he does to not want care to be performed  He is redirectable at times  No sexually inappropriate behaviors observed during this period of time by RN or PCA

## 2020-09-14 NOTE — ASSESSMENT & PLAN NOTE
· Macrocytic anemia   · Hgb 10 5 today   · B12/folate and iron panel all WNL  · Homocysteine level WNL  · MMA level pending   · Trend CBC in am   · Pt also with pancytopenia, likely reactive vs  chronic, WBC normalized and hgb/platelets are stable  No active bleeding noted

## 2020-09-14 NOTE — PROGRESS NOTES
Progress Note - Laquita Johnston 10/24/1928, 80 y o  male MRN: [id number]    Unit/Bed#: -01 Encounter: [id number]    Primary Care Provider: Gayla Gregory MD   Date and time admitted to hospital: 9/9/2020 12:03 PM      DOS: 9/14/2020    * Dementia with behavioral disturbance ([address] )  Assessment & Plan  · Resides in [facility]  Lately has been hypersexual and difficult to manage due to dementia at his current residence  Brought to ED for further evaluation of placement in dementia unit  · Likely dementia progression, CT on admission negative for intra-cranial abnormality   · Psychiatry re-evaluated,  · Recommending continuation of 1:1, pt not requiring restraints, mild improvement noted  Will trial patient off 1:1 today  · Has not showed any increased hypersexual behaviors since he has been hospitalized   · PT/OT recommending STR, psych recommending SNF with dementia unit, does not believe he would benefit from inpatient geripsych admission  CM following for placement and discussion with daughter  · Continue Aricept 10 mg daily and Memantine 10 mg BID  · Continue Seroquel 25 mg PRN QHS for agitation, psych recommending seroquel 12 5 mg BID for better day time coverage  · If there is concern for an acute change - consider obtaining an MRI brain to r/o CVA or mass  Low suspicion for any CVA at this time, appears to be secondary to underlying dementia process    Neck pain  Assessment & Plan  · Pt reporting some right sided neck pain x 24 hours, however due to dementia unable to follow much command   · Mild tenderness to palpation of the right occiput today   · CT c-spine obtained with mild to moderate spondylosis, no additional findings   · Could be positional as pt has been resting in bed   · Monitor closely  · Continue PT/OT      Hypokalemia  Assessment & Plan  · Resolved with repletion   · Trend BMP  · Mag level WNL    Atrial fibrillation (HCC)  Assessment & Plan  · EKG showed a   Fib with slow ventricular response   · Stable from prior EKG  · HR appears stable, intermittent mild bradycardia which has resolved     Hypothyroidism  Assessment & Plan  · TSH elevated  · Free T4 WNL, free T3 low  · Therefore would continue levothyroxine 50 mcg daily for now  · Pt will need repeat thyroid function testing in 4-6 weeks     Mild protein-calorie malnutrition (HCC)  Assessment & Plan  Malnutrition Findings:         Clavicular prominence noted  BMI Findings: Body mass index is 23 67 kg/m²  As evidenced by muscle wasting  · Continue nutrition supplements     Anemia  Assessment & Plan  · Macrocytic anemia   · Hgb 10 5 today   · B12/folate and iron panel all WNL  · Homocysteine level WNL  · MMA level pending   · Trend CBC in am   · Pt also with pancytopenia, likely reactive vs  chronic, WBC normalized and hgb/platelets are stable  No active bleeding noted  PVD (peripheral vascular disease) ([facility])  Assessment & Plan  · Continue ASA, statin  · Stable    Hyperlipidemia  Assessment & Plan  · Continue statin       VTE Pharmacologic Prophylaxis:   Pharmacologic: Heparin  Mechanical VTE Prophylaxis in Place: Yes    Patient Centered Rounds: I have performed bedside rounds with nursing staff today  Discussions with Specialists or Other Care Team Provider: Discussed with RN, STEPHEN and reviewed previous notes     Education and Discussions with Family / Patient: Discussed with patient at bedside, due to dementia, attempted to reach out to patient's sister Rey Norton  Unable to reach at both numbers, VM left with call back number     Time Spent for Care: 20 minutes  More than 50% of total time spent on counseling and coordination of care as described above      Current Length of Stay: 5 day(s)    Current Patient Status: Inpatient   Certification Statement: The patient will continue to require additional inpatient hospital stay due to monitoring patient off 1:1 status    Discharge Plan:  Not medically stable as above, awaiting placement at the [facility]    Code Status: Level 3 - DNAR and DNI      Subjective:   Patient is more somnolent today, however is arousable to verbal stimuli  Continues to be very disoriented  Continues to complain of some right-sided neck and occipital pain  Does at times tends to spit out his medications and food but this has been improved recently  Objective:     Vitals:   Temp (24hrs), Av 6 °F (37 °C), Min:97 4 °F (36 3 °C), Max:99 6 °F (37 6 °C)    Temp:  [97 4 °F (36 3 °C)-99 6 °F (37 6 °C)] 98 7 °F (37 1 °C)  HR:  [58-82] 58  Resp:  [17-18] 18  BP: (121-125)/(60-66) 125/60  SpO2:  [93 %-99 %] 93 %  Body mass index is 23 67 kg/m²  Input and Output Summary (last 24 hours): Intake/Output Summary (Last 24 hours) at 2020 1529  Last data filed at 2020 1300  Gross per 24 hour   Intake 50 ml   Output 250 ml   Net -200 ml       Physical Exam:     Physical Exam  Vitals signs reviewed  Constitutional:       General: He is not in acute distress  Appearance: He is not toxic-appearing  Comments: Patient is in no acute distress lying in his hospital bed resting comfortably  Dementia baseline  HENT:      Head: Normocephalic and atraumatic  Eyes:      Conjunctiva/sclera: Conjunctivae normal       Pupils: Pupils are equal, round, and reactive to light  Neck:      Musculoskeletal: No neck rigidity  Comments: Mild tenderness to the right occipital region and neck on palpation  Cardiovascular:      Rate and Rhythm: Normal rate  Pulses: Normal pulses  Heart sounds: Normal heart sounds  Comments: Irregular rhythm  Pulmonary:      Effort: Pulmonary effort is normal  No respiratory distress  Breath sounds: Normal breath sounds  No wheezing or rales  Abdominal:      General: Abdomen is flat  Bowel sounds are normal  There is no distension  Palpations: Abdomen is soft  Tenderness: There is no abdominal tenderness   There is no guarding  Musculoskeletal:         General: No swelling  Right lower leg: No edema  Left lower leg: No edema  Skin:     General: Skin is warm and dry  Findings: No erythema  Neurological:      Mental Status: He is alert  Psychiatric:      Comments: Confused         Additional Data:     Labs:    Results from last 7 days   Lab Units 09/14/20  0442   WBC Thousand/uL 8 83   HEMOGLOBIN g/dL 10 5*   HEMATOCRIT % 34 0*   PLATELETS Thousands/uL 104*   NEUTROS PCT % 68   LYMPHS PCT % 8*   MONOS PCT % 22*   EOS PCT % 0     Results from last 7 days   Lab Units 09/12/20  0627   POTASSIUM mmol/L 4 3   CHLORIDE mmol/L 106   CO2 mmol/L 24   BUN mg/dL 16   CREATININE mg/dL 0 84   CALCIUM mg/dL 8 9   ALK PHOS U/L 57   ALT U/L 14   AST U/L 25           * I Have Reviewed All Lab Data Listed Above  * Additional Pertinent Lab Tests Reviewed:  All Labs Within Last 24 Hours Reviewed    Imaging:    Imaging Reports Reviewed Today Include:  CT C-spine  Imaging Personally Reviewed by Myself Includes:  None    Recent Cultures (last 7 days):           Last 24 Hours Medication List:   Current Facility-Administered Medications   Medication Dose Route Frequency Provider Last Rate    acetaminophen  650 mg Oral Q6H PRN Igor Shines, CRNP      aspirin  81 mg Oral Daily Carol Martinez MD      donepezil  10 mg Oral Daily Carol Martinez MD      furosemide  20 mg Oral Daily Igor Shines, CRNP      heparin (porcine)  5,000 Units Subcutaneous [facility] [address] Carol Martinez MD      levothyroxine  50 mcg Oral Daily Carol Martinez MD      lidocaine  1 patch Topical Daily Bluford Hashimoto, PA-C      melatonin  6 mg Oral HS Igor Shines, CRNP      memantine  10 mg Oral BID Carol Martinez MD      ondansetron  4 mg Intravenous Q6H PRN Igor Shines, CRNP      pravastatin  80 mg Oral Daily With Jamison Pendleton MD      QUEtiapine  12 5 mg Oral BID Bluford Hashimoto, PA-C      senna  1 tablet Oral HS Carol Martinez MD Today, Patient Was Seen By: Jo Cobb PA-C    ** Please Note: Dictation voice to text software may have been used in the creation of this document   **

## 2020-09-14 NOTE — ASSESSMENT & PLAN NOTE
· EKG showed a   Fib with slow ventricular response   · Stable from prior EKG  · HR appears stable, intermittent mild bradycardia which has resolved

## 2020-09-14 NOTE — PROGRESS NOTES
09/14/20 from 2:09PM until now 7:13 PM   PM patient has been generally calm and sleepy  No sexually inappropriate behaviors observed during this period of time by RN or PCA

## 2020-09-14 NOTE — ASSESSMENT & PLAN NOTE
· Resides in [facility]  Lately has been hypersexual and difficult to manage due to dementia at his current residence  Brought to ED for further evaluation of placement in dementia unit  · Likely dementia progression, CT on admission negative for intra-cranial abnormality   · Psychiatry re-evaluated,  · Recommending continuation of 1:1, pt not requiring restraints, mild improvement noted  · Has not showed any increased hypersexual behaviors since he has been hospitalized   · PT/OT recommending STR, psych recommending SNF with dementia unit, does not believe he would benefit from inpatient geripsych admission  CM following for placement and discussion with daughter  · Continue Aricept 10 mg daily and Memantine 10 mg BID  · Continue Seroquel 25 mg PRN QHS for agitation, psych recommending seroquel 12 5 mg BID for better day time coverage  · If there is concern for an acute change - consider obtaining an MRI brain to r/o CVA or mass   Low suspicion for any CVA at this time, appears to be secondary to underlying dementia process

## 2020-09-14 NOTE — CASE MANAGEMENT
CM communicated with Yonis Condon at [facility]  Possibly able to go to one of their dementia units  CM spoke with Ernestina via telephone to discuss and she is agreeable for pt to go to one of [facility]' facilities

## 2020-09-15 PROBLEM — E78.5 HYPERLIPIDEMIA: Status: RESOLVED | Noted: 2020-09-09 | Resolved: 2020-09-15

## 2020-09-15 PROBLEM — E87.6 HYPOKALEMIA: Status: RESOLVED | Noted: 2020-09-11 | Resolved: 2020-09-15

## 2020-09-15 LAB
ANION GAP SERPL CALCULATED.3IONS-SCNC: 8 MMOL/L (ref 4–13)
BUN SERPL-MCNC: 17 MG/DL (ref 5–25)
CALCIUM SERPL-MCNC: 9.3 MG/DL (ref 8.3–10.1)
CHLORIDE SERPL-SCNC: 105 MMOL/L (ref 100–108)
CO2 SERPL-SCNC: 25 MMOL/L (ref 21–32)
CREAT SERPL-MCNC: 0.8 MG/DL (ref 0.6–1.3)
ERYTHROCYTE [DISTWIDTH] IN BLOOD BY AUTOMATED COUNT: 14.6 % (ref 11.6–15.1)
GFR SERPL CREATININE-BSD FRML MDRD: 78 ML/MIN/1.73SQ M
GLUCOSE SERPL-MCNC: 106 MG/DL (ref 65–140)
HCT VFR BLD AUTO: 35.9 % (ref 36.5–49.3)
HGB BLD-MCNC: 11.3 G/DL (ref 12–17)
MCH RBC QN AUTO: 35.8 PG (ref 26.8–34.3)
MCHC RBC AUTO-ENTMCNC: 31.5 G/DL (ref 31.4–37.4)
MCV RBC AUTO: 114 FL (ref 82–98)
NRBC BLD AUTO-RTO: 0 /100 WBCS
PLATELET # BLD AUTO: 109 THOUSANDS/UL (ref 149–390)
PMV BLD AUTO: 11.6 FL (ref 8.9–12.7)
POTASSIUM SERPL-SCNC: 4 MMOL/L (ref 3.5–5.3)
RBC # BLD AUTO: 3.16 MILLION/UL (ref 3.88–5.62)
SODIUM SERPL-SCNC: 138 MMOL/L (ref 136–145)
WBC # BLD AUTO: 9.02 THOUSAND/UL (ref 4.31–10.16)

## 2020-09-15 PROCEDURE — 80048 BASIC METABOLIC PNL TOTAL CA: CPT | Performed by: PHYSICIAN ASSISTANT

## 2020-09-15 PROCEDURE — 97110 THERAPEUTIC EXERCISES: CPT

## 2020-09-15 PROCEDURE — 99232 SBSQ HOSP IP/OBS MODERATE 35: CPT | Performed by: NURSE PRACTITIONER

## 2020-09-15 PROCEDURE — 85027 COMPLETE CBC AUTOMATED: CPT | Performed by: PHYSICIAN ASSISTANT

## 2020-09-15 RX ADMIN — MEMANTINE 10 MG: 10 TABLET ORAL at 09:59

## 2020-09-15 RX ADMIN — QUETIAPINE FUMARATE 12.5 MG: 25 TABLET ORAL at 09:57

## 2020-09-15 RX ADMIN — ASPIRIN 81 MG 81 MG: 81 TABLET ORAL at 10:05

## 2020-09-15 RX ADMIN — PRAVASTATIN SODIUM 80 MG: 80 TABLET ORAL at 18:11

## 2020-09-15 RX ADMIN — MEMANTINE 10 MG: 10 TABLET ORAL at 18:11

## 2020-09-15 RX ADMIN — HEPARIN SODIUM 5000 UNITS: 5000 INJECTION INTRAVENOUS; SUBCUTANEOUS at 14:35

## 2020-09-15 RX ADMIN — QUETIAPINE FUMARATE 12.5 MG: 25 TABLET ORAL at 18:11

## 2020-09-15 RX ADMIN — HEPARIN SODIUM 5000 UNITS: 5000 INJECTION INTRAVENOUS; SUBCUTANEOUS at 05:22

## 2020-09-15 RX ADMIN — FUROSEMIDE 20 MG: 20 TABLET ORAL at 10:04

## 2020-09-15 RX ADMIN — LIDOCAINE 5% 1 PATCH: 700 PATCH TOPICAL at 10:07

## 2020-09-15 RX ADMIN — DONEPEZIL HYDROCHLORIDE 10 MG: 5 TABLET ORAL at 09:58

## 2020-09-15 RX ADMIN — LEVOTHYROXINE SODIUM 50 MCG: 50 TABLET ORAL at 09:59

## 2020-09-15 NOTE — CASE MANAGEMENT
CM sent referrals to additional dementia facilities:     1  [facility]   2  [facility]   3  [facility]   4  [facility]   5   [address]

## 2020-09-15 NOTE — PLAN OF CARE
Problem: PHYSICAL THERAPY ADULT  Goal: Performs mobility at highest level of function for planned discharge setting  See evaluation for individualized goals  Description: Treatment/Interventions: Functional transfer training, LE strengthening/ROM, Therapeutic exercise, Endurance training, Bed mobility, Gait training, Patient/family training, Equipment eval/education  Equipment Recommended: Jhonatan Helton       See flowsheet documentation for full assessment, interventions and recommendations  Outcome: Progressing  Note: Prognosis: Fair  Problem List: Decreased strength, Decreased endurance, Impaired balance, Decreased mobility, Decreased cognition, Decreased safety awareness  Assessment: Pt was found supine in bed to begin session  He was very lethargic and became agitated when assisted to sit at the EOB  Pt refused to sit upright but was able to participate in bed level exercises  AAROM as well as AROM exercises were performed as charted with excessive cuing needed to correct form as well as to keep the pt awake  Attempted to perform bed mobility and again pt got agitated and said "leave me alone"  Pt had all needs met and call bell in reach, sleeping at the end of session  He would benefit from continued PT in order to promote safe and functional mobility  Barriers to Discharge: Decreased caregiver support     PT Discharge Recommendation: Post-Acute Rehabilitation Services     PT - OK to Discharge: Yes    See flowsheet documentation for full assessment

## 2020-09-15 NOTE — CASE MANAGEMENT
STEPHEN contacted Gloria at [phone] and left vm to discuss possible acceptance into dementia unit

## 2020-09-15 NOTE — ASSESSMENT & PLAN NOTE
· Pt reporting some right sided neck pain x 24 hours, however due to dementia unable to follow much command   · Mild tenderness to palpation of the right occiput today   · CT c-spine obtained with mild to moderate spondylosis, no additional findings   · Likely positional as pt has been resting in bed, continue Lidoderm patch

## 2020-09-15 NOTE — PHYSICAL THERAPY NOTE
Physical Therapy Progress Note     09/15/20 3191   Pain Assessment   Pain Assessment Tool FLACC   Pain Score No Pain   Ziegler-Baker FACES Pain Rating 2   Pain Location/Orientation Location: Neck   Restrictions/Precautions   Weight Bearing Precautions Per Order No   Other Precautions Bed Alarm;Cognitive; Fall Risk;Telemetry;Hard of hearing   General   Chart Reviewed Yes   Response to Previous Treatment Patient unable to report, no changes reported from family or staff   Family/Caregiver Present No   Cognition   Overall Cognitive Status Impaired   Arousal/Participation Arousable;Lethargic   Attention Attends with cues to redirect   Orientation Level Unable to assess   Memory Decreased short term memory;Decreased recall of recent events;Decreased recall of precautions   Following Commands Follows one step commands inconsistently   Comments Pt was identified by name and ID bracelet   Bed Mobility   Supine to Sit Unable to assess  (pt got agitated)   Activity Tolerance   Activity Tolerance Treatment limited secondary to medical complications (Comment); Treatment limited secondary to agitation   Nurse Made Aware yes, ok to see   Exercises   Heelslides Supine;20 reps;AAROM; Bilateral   Hip Abduction Supine;10 reps;AAROM; Bilateral   Hip Adduction Supine;20 reps;AAROM; Bilateral   Knee AROM Short Arc Quad Supine;20 reps;AROM; Bilateral   Ankle Pumps Supine;20 reps;AROM; Bilateral   Assessment   Prognosis Fair   Problem List Decreased strength;Decreased endurance; Impaired balance;Decreased mobility; Decreased cognition;Decreased safety awareness   Assessment Pt was found supine in bed to begin session  He was very lethargic and became agitated when assisted to sit at the EOB  Pt refused to sit upright but was able to participate in bed level exercises  AAROM as well as AROM exercises were performed as charted with excessive cuing needed to correct form as well as to keep the pt awake   Attempted to perform bed mobility and again pt got agitated and said "leave me alone"  Pt had all needs met and call bell in reach, sleeping at the end of session  He would benefit from continued PT in order to promote safe and functional mobility  Barriers to Discharge Decreased caregiver support   Goals   Patient Goals to be left alone   STG Expiration Date 09/24/20   Short Term Goal #1 1  Pt will complete bed mobility with Mod I to increase functional mobility  2  Pt will complete sit to stand transfers with Mod I to increase functional mobility  3  Pt will ambulate 150 ft with RW with Mod I without LOB 4  Pt will increase B/L LE strength by 1 grade to facilitate improved functional mobility with decreased risk of falls  5  Pt will increase standing balance to fair in order to decrease risk of falls    PT Treatment Day 1   Plan   Treatment/Interventions Functional transfer training;LE strengthening/ROM; Therapeutic exercise; Endurance training;Bed mobility;Gait training;Patient/family training;Equipment eval/education   Progress Slow progress, cognitive deficits   Recommendation   PT Discharge Recommendation Post-Acute Rehabilitation Services   Equipment Recommended Walker   PT - OK to Discharge Yes   Additional Comments when medically cleared     Satya Taylor PTA

## 2020-09-15 NOTE — ASSESSMENT & PLAN NOTE
· Resides in [facility]  Lately has been hypersexual and difficult to manage due to dementia at his current residence  Brought to ED for further evaluation of placement in dementia unit  · Likely dementia progression, CT on admission negative for intra-cranial abnormality   · Psychiatry re-evaluated,  · Patient has remained safe and stable off 1:1 since 09/14, pt not requiring restraints, mild improvement noted  · Has not showed any increased hypersexual behaviors since he has been hospitalized   · PT/OT recommending STR, psych recommending SNF with dementia unit, does not believe he would benefit from inpatient geripsych admission  CM following for placement and discussion with daughter  · Continue Aricept 10 mg daily and Memantine 10 mg BID  · Continue Seroquel 25 mg PRN QHS for agitation, psych recommending seroquel 12 5 mg BID for better day time coverage  · If there is concern for an acute change - consider obtaining an MRI brain to r/o CVA or mass   Low suspicion for any CVA at this time, appears to be secondary to underlying dementia process

## 2020-09-15 NOTE — PLAN OF CARE
Problem: Potential for Falls  Goal: Patient will remain free of falls  Description: INTERVENTIONS:  - Assess patient frequently for physical needs  -  Identify cognitive and physical deficits and behaviors that affect risk of falls  -  [place] fall precautions as indicated by assessment   - Educate patient/family on patient safety including physical limitations  - Instruct patient to call for assistance with activity based on assessment  - Modify environment to reduce risk of injury  - Consider OT/PT consult to assist with strengthening/mobility  Outcome: Progressing     Problem: Prexisting or High Potential for Compromised Skin Integrity  Goal: Skin integrity is maintained or improved  Description: INTERVENTIONS:  - Identify patients at risk for skin breakdown  - Assess and monitor skin integrity  - Assess and monitor nutrition and hydration status  - Monitor labs   - Assess for incontinence   - Turn and reposition patient  - Assist with mobility/ambulation  - Relieve pressure over bony prominences  - Avoid friction and shearing  - Provide appropriate hygiene as needed including keeping skin clean and dry  - Evaluate need for skin moisturizer/barrier cream  - Collaborate with interdisciplinary team   - Patient/family teaching  - Consider wound care consult   Outcome: Progressing     Problem: SAFETY ADULT  Goal: Patient will remain free of falls  Description: INTERVENTIONS:  - Assess patient frequently for physical needs  -  Identify cognitive and physical deficits and behaviors that affect risk of falls    -  [place] fall precautions as indicated by assessment   - Educate patient/family on patient safety including physical limitations  - Instruct patient to call for assistance with activity based on assessment  - Modify environment to reduce risk of injury  - Consider OT/PT consult to assist with strengthening/mobility  Outcome: Progressing  Goal: Maintain or return to baseline ADL function  Description: INTERVENTIONS:  -  Assess patient's ability to carry out ADLs; assess patient's baseline for ADL function and identify physical deficits which impact ability to perform ADLs (bathing, care of mouth/teeth, toileting, grooming, dressing, etc )  - Assess/evaluate cause of self-care deficits   - Assess range of motion  - Assess patient's mobility; develop plan if impaired  - Assess patient's need for assistive devices and provide as appropriate  - Encourage maximum independence but intervene and supervise when necessary  - Involve family in performance of ADLs  - Assess for home care needs following discharge   - Consider OT consult to assist with ADL evaluation and planning for discharge  - Provide patient education as appropriate  Outcome: Progressing  Goal: Maintain or return mobility status to optimal level  Description: INTERVENTIONS:  - Assess patient's baseline mobility status (ambulation, transfers, stairs, etc )    - Identify cognitive and physical deficits and behaviors that affect mobility  - Identify mobility aids required to assist with transfers and/or ambulation (gait belt, sit-to-stand, lift, walker, cane, etc )  - [place] fall precautions as indicated by assessment  - Record patient progress and toleration of activity level on Mobility SBAR; progress patient to next Phase/Stage  - Instruct patient to call for assistance with activity based on assessment  - Consider rehabilitation consult to assist with strengthening/weightbearing, etc   Outcome: Progressing     Problem: DISCHARGE PLANNING  Goal: Discharge to home or other facility with appropriate resources  Description: INTERVENTIONS:  - Identify barriers to discharge w/patient and caregiver  - Arrange for needed discharge resources and transportation as appropriate  - Identify discharge learning needs (meds, wound care, etc )  - Arrange for interpretive services to assist at discharge as needed  - Refer to Case Management Department for coordinating discharge planning if the patient needs post-hospital services based on physician/advanced practitioner order or complex needs related to functional status, cognitive ability, or social support system  Outcome: Progressing     Problem: Knowledge Deficit  Goal: Patient/family/caregiver demonstrates understanding of disease process, treatment plan, medications, and discharge instructions  Description: Complete learning assessment and assess knowledge base  Interventions:  - Provide teaching at level of understanding  - Provide teaching via preferred learning methods  Outcome: Progressing     Problem: Nutrition/Hydration-ADULT  Goal: Nutrient/Hydration intake appropriate for improving, restoring or maintaining nutritional needs  Description: Monitor and assess patient's nutrition/hydration status for malnutrition  Collaborate with interdisciplinary team and initiate plan and interventions as ordered  Monitor patient's weight and dietary intake as ordered or per policy  Utilize nutrition screening tool and intervene as necessary  Determine patient's food preferences and provide high-protein, high-caloric foods as appropriate       INTERVENTIONS:  - Monitor oral intake, urinary output, labs, and treatment plans  - Assess nutrition and hydration status and recommend course of action  - Evaluate amount of meals eaten  - Assist patient with eating if necessary   - Allow adequate time for meals  - Recommend/ encourage appropriate diets, oral nutritional supplements, and vitamin/mineral supplements  - Order, calculate, and assess calorie counts as needed  - Recommend, monitor, and adjust tube feedings and TPN/PPN based on assessed needs  - Assess need for intravenous fluids  - Provide specific nutrition/hydration education as appropriate  - Include patient/family/caregiver in decisions related to nutrition  Outcome: Progressing

## 2020-09-15 NOTE — PROGRESS NOTES
Progress Note - Ibeth Dae 10/24/1928, 80 y o  male MRN: [id number]    Unit/Bed#: -01 Encounter: [id number]    Primary Care Provider: Neil Barahona MD   Date and time admitted to hospital: 9/9/2020 12:03 PM    Neck pain  Assessment & Plan  · Pt reporting some right sided neck pain x 24 hours, however due to dementia unable to follow much command   · Mild tenderness to palpation of the right occiput today   · CT c-spine obtained with mild to moderate spondylosis, no additional findings   · Likely positional as pt has been resting in bed, continue Lidoderm patch      Atrial fibrillation (HCC)  Assessment & Plan  · EKG showed a  Fib with slow ventricular response   · Stable from prior EKG  · HR appears stable, intermittent mild bradycardia which has resolved     Hypothyroidism  Assessment & Plan  · TSH elevated  · Free T4 WNL, free T3 low  · Therefore would continue levothyroxine 50 mcg daily for now  · Pt will need repeat thyroid function testing in 4-6 weeks     Mild protein-calorie malnutrition (HCC)  Assessment & Plan  Malnutrition Findings:         Clavicular prominence noted  BMI Findings: Body mass index is 23 67 kg/m²  As evidenced by muscle wasting  · Continue nutrition supplements     Anemia  Assessment & Plan  · Macrocytic anemia   · Hgb 11 3 today   · B12/folate and iron panel all WNL  · Homocysteine level WNL  · MMA level pending   · Trend CBC in am   · Pt also with pancytopenia, likely reactive vs  chronic, WBC normalized and hgb/platelets are stable  No active bleeding noted  PVD (peripheral vascular disease) ([address] )  Assessment & Plan  · Continue ASA, statin  · Stable    * Dementia with behavioral disturbance (HCC)  Assessment & Plan  · Resides in [facility]  Lately has been hypersexual and difficult to manage due to dementia at his current residence   Brought to ED for further evaluation of placement in dementia unit  · Likely dementia progression, CT on admission negative for intra-cranial abnormality   · Psychiatry re-evaluated,  · Patient has remained safe and stable off 1:1 since 09/14, pt not requiring restraints, mild improvement noted  · Has not showed any increased hypersexual behaviors since he has been hospitalized   · PT/OT recommending STR, psych recommending SNF with dementia unit, does not believe he would benefit from inpatient geripsych admission  CM following for placement and discussion with daughter  · Continue Aricept 10 mg daily and Memantine 10 mg BID  · Continue Seroquel 25 mg PRN QHS for agitation, psych recommending seroquel 12 5 mg BID for better day time coverage  · If there is concern for an acute change - consider obtaining an MRI brain to r/o CVA or mass  Low suspicion for any CVA at this time, appears to be secondary to underlying dementia process    Hypokalemiaresolved as of 9/15/2020  Assessment & Plan  · Resolved with repletion   · Trend BMP  · Mag level WNL    Hyperlipidemiaresolved as of 9/15/2020  Assessment & Plan  · Continue statin        VTE Pharmacologic Prophylaxis:   Pharmacologic: Heparin  Mechanical VTE Prophylaxis in Place: Yes    Patient Centered Rounds: I have performed bedside rounds with nursing staff today  Discussions with Specialists or Other Care Team Provider:  Reviewed case management notes discussed with case management reviewed previous provider's notes discussed with primary RN    Education and Discussions with Family / Patient:  Discussed plan of care with patient's family denies any additional questions or concerns at this time    Time Spent for Care: 30 minutes  More than 50% of total time spent on counseling and coordination of care as described above      Current Length of Stay: 6 day(s)    Current Patient Status: Inpatient   Certification Statement: The patient will continue to require additional inpatient hospital stay due to Finding appropriate safe short-term rehab facility that can accommodate the patient with his history of dementia    Discharge Plan / Estimated Discharge Date:  24 hours pending facility placement      Code Status: Level 3 - DNAR and DNI      Subjective:   Pt is at his baseline, not making sense  Resting comfortably in bed, offers no complaints when asked  Non violent and no inappropriate behavior observed  Objective:     Vitals:   Temp (24hrs), Av 7 °F (36 5 °C), Min:96 2 °F (35 7 °C), Max:98 7 °F (37 1 °C)    Temp:  [96 2 °F (35 7 °C)-98 7 °F (37 1 °C)] 98 3 °F (36 8 °C)  HR:  [58] 58  Resp:  [18] 18  BP: (123-125)/(60-64) 125/64  SpO2:  [93 %] 93 %  Body mass index is 23 67 kg/m²  Input and Output Summary (last 24 hours): Intake/Output Summary (Last 24 hours) at 9/15/2020 1121  Last data filed at 9/15/2020 0814  Gross per 24 hour   Intake 50 ml   Output 200 ml   Net -150 ml       Physical Exam:     Physical Exam  Constitutional:       Appearance: He is ill-appearing  Neck:      Musculoskeletal: Normal range of motion  Cardiovascular:      Rate and Rhythm: Normal rate  Pulmonary:      Effort: Pulmonary effort is normal    Abdominal:      General: Abdomen is flat  Musculoskeletal: Normal range of motion  Skin:     General: Skin is warm  Coloration: Skin is pale  Neurological:      Mental Status: He is alert  Mental status is at baseline  Psychiatric:         Attention and Perception: He is inattentive  Speech: Speech is tangential          Cognition and Memory: Cognition is impaired  Memory is impaired  He exhibits impaired recent memory and impaired remote memory  Judgment: Judgment is inappropriate         Additional Data:     Labs:    Results from last 7 days   Lab Units 09/15/20  0555 20  0442   WBC Thousand/uL 9 02 8 83   HEMOGLOBIN g/dL 11 3* 10 5*   HEMATOCRIT % 35 9* 34 0*   PLATELETS Thousands/uL 109* 104*   NEUTROS PCT %  --  68   LYMPHS PCT %  --  8*   MONOS PCT %  --  22*   EOS PCT %  --  0     Results from last 7 days   Lab Units 09/15/20  0555 09/12/20  0627   POTASSIUM mmol/L 4 0 4 3   CHLORIDE mmol/L 105 106   CO2 mmol/L 25 24   BUN mg/dL 17 16   CREATININE mg/dL 0 80 0 84   CALCIUM mg/dL 9 3 8 9   ALK PHOS U/L  --  57   ALT U/L  --  14   AST U/L  --  25           * I Have Reviewed All Lab Data Listed Above  * Additional Pertinent Lab Tests Reviewed: [address] Admission Reviewed  Recent Cultures (last 7 days):           Last 24 Hours Medication List:   Current Facility-Administered Medications   Medication Dose Route Frequency Provider Last Rate    acetaminophen  650 mg Oral Q6H PRN DORITA Lopez      aspirin  81 mg Oral Daily Terrance Done, MD      donepezil  10 mg Oral Daily Daivd Done, MD      furosemide  20 mg Oral Daily DORITA Lopez      heparin (porcine)  5,000 Units Subcutaneous [facility] Allyssavd Done, MD      levothyroxine  50 mcg Oral Daily Daivd Done, MD      lidocaine  1 patch Topical Daily Magda Jensen PA-C      melatonin  6 mg Oral HS DORITA Lopez      memantine  10 mg Oral BID Daieligio Done, MD      ondansetron  4 mg Intravenous Q6H PRN DORITA Lopez      pravastatin  80 mg Oral Daily With Rubi Maloney MD      QUEtiapine  12 5 mg Oral BID Magda Jensen PA-C      senna  1 tablet Oral HS Terrance Done, MD          Today, Patient Was Seen By: DORITA Isaacs    ** Please Note: Dragon 360 Dictation voice to text software may have been used in the creation of this document   **

## 2020-09-15 NOTE — NURSING NOTE
Pt was calm and slept soundly for most of the night  No sexually inappropiate behavior noted  Pt physically aggressive at times during care but is easily consolable  Foam wedges used to help redistribute sacral pressure while asleep

## 2020-09-15 NOTE — ASSESSMENT & PLAN NOTE
· Macrocytic anemia   · Hgb 11 3 today   · B12/folate and iron panel all WNL  · Homocysteine level WNL  · MMA level pending   · Trend CBC in am   · Pt also with pancytopenia, likely reactive vs  chronic, WBC normalized and hgb/platelets are stable  No active bleeding noted

## 2020-09-15 NOTE — CASE MANAGEMENT
CM notified by Kvng Cuevas from [address] [place] [address] that [facility] is not able to accept due to pt's physically aggressive behaviors  Therese Martinez responded in [employer] that care needs exceed current capacity  [facility] did not have a bed available since yesterday but continues to follow  [place] accommodate  Gloria following

## 2020-09-16 VITALS
HEART RATE: 84 BPM | DIASTOLIC BLOOD PRESSURE: 90 MMHG | WEIGHT: 155.65 LBS | TEMPERATURE: 97.6 F | BODY MASS INDEX: 23.59 KG/M2 | SYSTOLIC BLOOD PRESSURE: 140 MMHG | RESPIRATION RATE: 18 BRPM | OXYGEN SATURATION: 95 % | HEIGHT: 68 IN

## 2020-09-16 LAB
ANION GAP SERPL CALCULATED.3IONS-SCNC: 8 MMOL/L (ref 4–13)
BASOPHILS # BLD AUTO: 0.03 THOUSANDS/ΜL (ref 0–0.1)
BASOPHILS NFR BLD AUTO: 0 % (ref 0–1)
BUN SERPL-MCNC: 23 MG/DL (ref 5–25)
CALCIUM SERPL-MCNC: 9.6 MG/DL (ref 8.3–10.1)
CHLORIDE SERPL-SCNC: 108 MMOL/L (ref 100–108)
CO2 SERPL-SCNC: 28 MMOL/L (ref 21–32)
CREAT SERPL-MCNC: 0.86 MG/DL (ref 0.6–1.3)
EOSINOPHIL # BLD AUTO: 0 THOUSAND/ΜL (ref 0–0.61)
EOSINOPHIL NFR BLD AUTO: 0 % (ref 0–6)
ERYTHROCYTE [DISTWIDTH] IN BLOOD BY AUTOMATED COUNT: 14.6 % (ref 11.6–15.1)
GFR SERPL CREATININE-BSD FRML MDRD: 76 ML/MIN/1.73SQ M
GLUCOSE SERPL-MCNC: 100 MG/DL (ref 65–140)
HCT VFR BLD AUTO: 34.4 % (ref 36.5–49.3)
HGB BLD-MCNC: 10.6 G/DL (ref 12–17)
IMM GRANULOCYTES # BLD AUTO: 0.4 THOUSAND/UL (ref 0–0.2)
IMM GRANULOCYTES NFR BLD AUTO: 5 % (ref 0–2)
LYMPHOCYTES # BLD AUTO: 0.91 THOUSANDS/ΜL (ref 0.6–4.47)
LYMPHOCYTES NFR BLD AUTO: 11 % (ref 14–44)
MCH RBC QN AUTO: 35.6 PG (ref 26.8–34.3)
MCHC RBC AUTO-ENTMCNC: 30.8 G/DL (ref 31.4–37.4)
MCV RBC AUTO: 115 FL (ref 82–98)
METHYLMALONATE SERPL-SCNC: 139 NMOL/L (ref 0–378)
MONOCYTES # BLD AUTO: 1.94 THOUSAND/ΜL (ref 0.17–1.22)
MONOCYTES NFR BLD AUTO: 23 % (ref 4–12)
NEUTROPHILS # BLD AUTO: 5.08 THOUSANDS/ΜL (ref 1.85–7.62)
NEUTS SEG NFR BLD AUTO: 61 % (ref 43–75)
NRBC BLD AUTO-RTO: 0 /100 WBCS
PLATELET # BLD AUTO: 123 THOUSANDS/UL (ref 149–390)
PMV BLD AUTO: 11.7 FL (ref 8.9–12.7)
POTASSIUM SERPL-SCNC: 4.1 MMOL/L (ref 3.5–5.3)
RBC # BLD AUTO: 2.98 MILLION/UL (ref 3.88–5.62)
SARS-COV-2 RNA RESP QL NAA+PROBE: NEGATIVE
SL AMB DISCLAIMER: NORMAL
SODIUM SERPL-SCNC: 144 MMOL/L (ref 136–145)
WBC # BLD AUTO: 8.36 THOUSAND/UL (ref 4.31–10.16)

## 2020-09-16 PROCEDURE — 99239 HOSP IP/OBS DSCHRG MGMT >30: CPT | Performed by: PHYSICIAN ASSISTANT

## 2020-09-16 PROCEDURE — 87635 SARS-COV-2 COVID-19 AMP PRB: CPT | Performed by: PHYSICIAN ASSISTANT

## 2020-09-16 PROCEDURE — 80048 BASIC METABOLIC PNL TOTAL CA: CPT | Performed by: NURSE PRACTITIONER

## 2020-09-16 PROCEDURE — 85025 COMPLETE CBC W/AUTO DIFF WBC: CPT | Performed by: PHYSICIAN ASSISTANT

## 2020-09-16 RX ORDER — SENNOSIDES 8.6 MG
1 TABLET ORAL
Qty: 120 EACH | Refills: 0
Start: 2020-09-16

## 2020-09-16 RX ORDER — QUETIAPINE FUMARATE 25 MG/1
12.5 TABLET, FILM COATED ORAL 2 TIMES DAILY
Qty: 30 TABLET | Refills: 0
Start: 2020-09-16

## 2020-09-16 RX ORDER — LIDOCAINE 50 MG/G
1 PATCH TOPICAL DAILY
Refills: 0
Start: 2020-09-17

## 2020-09-16 RX ADMIN — QUETIAPINE FUMARATE 12.5 MG: 25 TABLET ORAL at 07:50

## 2020-09-16 RX ADMIN — HEPARIN SODIUM 5000 UNITS: 5000 INJECTION INTRAVENOUS; SUBCUTANEOUS at 05:00

## 2020-09-16 RX ADMIN — GLYCERIN, HYPROMELLOSE, POLYETHYLENE GLYCOL 1 DROP: .2; .2; 1 LIQUID OPHTHALMIC at 03:00

## 2020-09-16 RX ADMIN — DONEPEZIL HYDROCHLORIDE 10 MG: 5 TABLET ORAL at 07:50

## 2020-09-16 RX ADMIN — FUROSEMIDE 20 MG: 20 TABLET ORAL at 08:01

## 2020-09-16 RX ADMIN — LIDOCAINE 5% 1 PATCH: 700 PATCH TOPICAL at 08:01

## 2020-09-16 RX ADMIN — MEMANTINE 10 MG: 10 TABLET ORAL at 07:50

## 2020-09-16 RX ADMIN — ASPIRIN 81 MG 81 MG: 81 TABLET ORAL at 08:01

## 2020-09-16 RX ADMIN — LEVOTHYROXINE SODIUM 50 MCG: 50 TABLET ORAL at 08:01

## 2020-09-16 NOTE — ASSESSMENT & PLAN NOTE
· TSH elevated  · Free T4 WNL, free T3 low  · Therefore would continue home dose of levothyroxine 50 mcg daily for now  · Pt will need repeat thyroid function testing in 4-6 weeks

## 2020-09-16 NOTE — PLAN OF CARE
Problem: Potential for Falls  Goal: Patient will remain free of falls  Description: INTERVENTIONS:  - Assess patient frequently for physical needs  -  Identify cognitive and physical deficits and behaviors that affect risk of falls  -  [place] fall precautions as indicated by assessment   - Educate patient/family on patient safety including physical limitations  - Instruct patient to call for assistance with activity based on assessment  - Modify environment to reduce risk of injury  - Consider OT/PT consult to assist with strengthening/mobility  Outcome: Progressing     Problem: Prexisting or High Potential for Compromised Skin Integrity  Goal: Skin integrity is maintained or improved  Description: INTERVENTIONS:  - Identify patients at risk for skin breakdown  - Assess and monitor skin integrity  - Assess and monitor nutrition and hydration status  - Monitor labs   - Assess for incontinence   - Turn and reposition patient  - Assist with mobility/ambulation  - Relieve pressure over bony prominences  - Avoid friction and shearing  - Provide appropriate hygiene as needed including keeping skin clean and dry  - Evaluate need for skin moisturizer/barrier cream  - Collaborate with interdisciplinary team   - Patient/family teaching  - Consider wound care consult   Outcome: Progressing     Problem: SAFETY ADULT  Goal: Patient will remain free of falls  Description: INTERVENTIONS:  - Assess patient frequently for physical needs  -  Identify cognitive and physical deficits and behaviors that affect risk of falls    -  [place] fall precautions as indicated by assessment   - Educate patient/family on patient safety including physical limitations  - Instruct patient to call for assistance with activity based on assessment  - Modify environment to reduce risk of injury  - Consider OT/PT consult to assist with strengthening/mobility  Outcome: Progressing  Goal: Maintain or return to baseline ADL function  Description: INTERVENTIONS:  -  Assess patient's ability to carry out ADLs; assess patient's baseline for ADL function and identify physical deficits which impact ability to perform ADLs (bathing, care of mouth/teeth, toileting, grooming, dressing, etc )  - Assess/evaluate cause of self-care deficits   - Assess range of motion  - Assess patient's mobility; develop plan if impaired  - Assess patient's need for assistive devices and provide as appropriate  - Encourage maximum independence but intervene and supervise when necessary  - Involve family in performance of ADLs  - Assess for home care needs following discharge   - Consider OT consult to assist with ADL evaluation and planning for discharge  - Provide patient education as appropriate  Outcome: Progressing  Goal: Maintain or return mobility status to optimal level  Description: INTERVENTIONS:  - Assess patient's baseline mobility status (ambulation, transfers, stairs, etc )    - Identify cognitive and physical deficits and behaviors that affect mobility  - Identify mobility aids required to assist with transfers and/or ambulation (gait belt, sit-to-stand, lift, walker, cane, etc )  - [place] fall precautions as indicated by assessment  - Record patient progress and toleration of activity level on Mobility SBAR; progress patient to next Phase/Stage  - Instruct patient to call for assistance with activity based on assessment  - Consider rehabilitation consult to assist with strengthening/weightbearing, etc   Outcome: Progressing     Problem: DISCHARGE PLANNING  Goal: Discharge to home or other facility with appropriate resources  Description: INTERVENTIONS:  - Identify barriers to discharge w/patient and caregiver  - Arrange for needed discharge resources and transportation as appropriate  - Identify discharge learning needs (meds, wound care, etc )  - Arrange for interpretive services to assist at discharge as needed  - Refer to Case Management Department for coordinating discharge planning if the patient needs post-hospital services based on physician/advanced practitioner order or complex needs related to functional status, cognitive ability, or social support system  Outcome: Progressing     Problem: Knowledge Deficit  Goal: Patient/family/caregiver demonstrates understanding of disease process, treatment plan, medications, and discharge instructions  Description: Complete learning assessment and assess knowledge base  Interventions:  - Provide teaching at level of understanding  - Provide teaching via preferred learning methods  Outcome: Progressing     Problem: Nutrition/Hydration-ADULT  Goal: Nutrient/Hydration intake appropriate for improving, restoring or maintaining nutritional needs  Description: Monitor and assess patient's nutrition/hydration status for malnutrition  Collaborate with interdisciplinary team and initiate plan and interventions as ordered  Monitor patient's weight and dietary intake as ordered or per policy  Utilize nutrition screening tool and intervene as necessary  Determine patient's food preferences and provide high-protein, high-caloric foods as appropriate       INTERVENTIONS:  - Monitor oral intake, urinary output, labs, and treatment plans  - Assess nutrition and hydration status and recommend course of action  - Evaluate amount of meals eaten  - Assist patient with eating if necessary   - Allow adequate time for meals  - Recommend/ encourage appropriate diets, oral nutritional supplements, and vitamin/mineral supplements  - Order, calculate, and assess calorie counts as needed  - Recommend, monitor, and adjust tube feedings and TPN/PPN based on assessed needs  - Assess need for intravenous fluids  - Provide specific nutrition/hydration education as appropriate  - Include patient/family/caregiver in decisions related to nutrition  Outcome: Progressing

## 2020-09-16 NOTE — CASE MANAGEMENT
[address], [address] does not have a bed available  [facility] does not have a bed available  [facility] does not have an appropriate bed available  [facility] did not respond  [facility] can accept

## 2020-09-16 NOTE — ASSESSMENT & PLAN NOTE
· Macrocytic anemia   · Hgb 10 6 today   · B12/folate and iron panel all WNL  · Homocysteine level WNL  · MMA level pending   · Trend CBC  · Pt also with pancytopenia, likely reactive vs  Chronic, which has now significantly improved

## 2020-09-16 NOTE — ASSESSMENT & PLAN NOTE
· Resides in [facility]  Was noted to be hypersexual and difficult to manage due to dementia  Brought to ED for further evaluation of placement in dementia unit  · Likely dementia progression, CT on admission negative for intra-cranial abnormality   · Psychiatry re-evaluated,  · Patient has remained safe and stable off 1:1 since 09/14, pt not requiring restraints  · Has not showed any increased hypersexual behaviors since he has been hospitalized   · PT/OT recommending STR, psych recommending SNF with dementia unit, does not believe he would benefit from inpatient geripsych admission  CM following for placement, accepted to [facility] today  · Continue Aricept 10 mg daily and Memantine 10 mg BID  · Continue Seroquel 12 5 mg BID for day time coverage, improvement of behaviors on this   Has not required PRN Seroquel at night for last 48 hours

## 2020-09-16 NOTE — CASE MANAGEMENT
CM received a call back from [place] with [facility] providing transport time which is by [facility] at 12:30pm  Medical necessity completed and emailed to [employer]  Copy placed in medical records  Medical necessity placed in pt's chart  SLIM, RN, [facility] and [place] made aware of transport time

## 2020-09-16 NOTE — DISCHARGE INSTRUCTIONS
Please follow up with your primary care provider within one week  Continue seroquel 12 5 mg twice a day   If there is any increased confusion, agitation please come back to the hospital for further evaluation   Dementia   WHAT YOU NEED TO KNOW:   What is dementia? Dementia is a condition that causes loss of memory, thought control, and judgment  Alzheimer disease is the most common cause of dementia  Other common causes are loss of blood flow or nerve damage in the brain, and long-term alcohol or drug use  Dementia cannot be cured or prevented, but treatment may slow or reduce your symptoms  What increases my risk for dementia? · A family history of dementia    · Diseases such as diabetes or high blood pressure     · A head injury, brain tumor, or stroke    · Toxins such as alcohol or cigarette smoke     · Lack of activity or exercise    · Viruses and bacteria that cause illnesses such as HIV and syphilis  What are the signs and symptoms of dementia? Dementia may develop quickly over a few months after a head injury or stroke  It may develop slowly over many years if you have Alzheimer disease  Your memory and other mental abilities may decline steadily  They may stay the same for a time and then decline again  You may have any of the following:  · Loss of short-term memory, followed by loss of long-term memory    · Trouble remembering to go to the bathroom to urinate or have a bowel movement    · Anger, or violent behavior     · Depression, anxiety, or hallucinations (you see or hear things that are not real)  How is dementia diagnosed? Your healthcare provider will ask you or someone close to you about your symptoms  He will ask when your symptoms began, and if they have gotten worse with time  He may also ask if you have any family members with dementia  · Memory testing  will be done regularly so healthcare providers can monitor memory changes over time   Healthcare providers will test your long-term memory by asking questions about how much you remember from the past  They will also test your short-term memory by asking you to remember new facts  · Blood tests  may be used to rule out any other conditions that could be causing your symptoms  Some temporary conditions may be similar to dementia but can be treated  · An MRI or CT scan  can help healthcare providers find damage to your brain caused by dementia  The pictures may also show an injury or blood flow problems  You may be given contrast liquid before the pictures are taken  Tell the healthcare provider if you have ever had an allergic reaction to contrast liquid  Do not enter the MRI room with anything metal  Metal can cause serious injury  Tell the healthcare provider if you have any metal in or on your body  How is dementia treated? The goal of treatment is to help you keep your current health for as long as possible  You may need any of the following:  · Dementia medicines  may be used to help slow the decline in your memory  · Antipsychotics  may be used to help improve your behavior, and control anger or violence  · Antianxiety medicine  may be used to help reduce anxiety and keep you calm  · Antidepressants  may be used to help improve your mood and reduce your symptoms of depression  How can I manage my dementia? You may begin to need an in-home aide to help you remember your daily tasks  Ask your healthcare provider for a list of organizations that can help  It is best to arrange for help while you are thinking clearly  The following may also help you manage your dementia:  · Keep your mind and body active  Do activities that you love, such as art, gardening, or listening to music  Call or visit people often  This will keep your social skills sharp, and may help reduce depression  · Take all of your medicines as directed    This will help control medical conditions, such as high blood pressure, high cholesterol, and diabetes  · Write daily schedules and routines  Record medical appointments, times to take your medicines, meal times, or any other things to remember  Write down reminders to use the bathroom if you have trouble remembering  You may need to ask someone to write things down for you  · Place clocks and calendars where you can see them  This will help you remember appointments and tasks  · Do not smoke  Nicotine and other chemicals in cigarettes and cigars can cause lung damage  Ask your healthcare provider for information if you currently smoke and need help to quit  E-cigarettes or smokeless tobacco still contain nicotine  Talk to your healthcare provider before you use these products  · Eat healthy foods  Examples are fruits, vegetables, whole-grain breads, low-fat dairy products, beans, lean meats, and fish  Ask if you need to be on a special diet  When should I or someone close to me seek immediate care? · You have signs of delirium, such as extreme confusion, and seeing or hearing things that are not there  · You become angry or violent, and cannot be calmed down  · You faint and cannot be woken  When should I or someone close to me contact my healthcare provider? · You have a fever  · You have increased confusion, behavior, or mood changes  · You have questions or concerns about your condition or care  CARE AGREEMENT:   You have the right to help plan your care  Learn about your health condition and how it may be treated  Discuss treatment options with your caregivers to decide what care you want to receive  You always have the right to refuse treatment  The above information is an  only  It is not intended as medical advice for individual conditions or treatments  Talk to your doctor, nurse or pharmacist before following any medical regimen to see if it is safe and effective for you    © 2017 [employer] Information is for End User's use only and may not be sold, redistributed or otherwise used for commercial purposes  All illustrations and images included in CareNotes® are the copyrighted property of [employer]  or [employer] Worrell

## 2020-09-16 NOTE — ASSESSMENT & PLAN NOTE
· Pt reporting some right sided neck pain on 9/13, however due to dementia unable to follow much command   · CT c-spine obtained with mild to moderate spondylosis, no additional findings   · Likely positional as pt has been resting in bed, continue Lidoderm patch    · This is now resolved, pt without any tenderness to c-spine, denies any pain

## 2020-09-16 NOTE — DISCHARGE SUMMARY
Discharge- Jayro Jose 10/24/1928, 80 y o  male MRN: [id number]    Unit/Bed#: -01 Encounter: [id number]    Primary Care Provider: Compa Maza MD   Date and time admitted to hospital: 9/9/2020 12:03 PM      DOS: 9/16/2020    * Dementia with behavioral disturbance ([facility] [address] )  Assessment & Plan  · Resides in [facility]  Was noted to be hypersexual and difficult to manage due to dementia  Brought to ED for further evaluation of placement in dementia unit  · Likely dementia progression, CT on admission negative for intra-cranial abnormality   · Psychiatry re-evaluated,  · Patient has remained safe and stable off 1:1 since 09/14, pt not requiring restraints  · Has not showed any increased hypersexual behaviors since he has been hospitalized   · PT/OT recommending STR, psych recommending SNF with dementia unit, does not believe he would benefit from inpatient geripsych admission  CM following for placement, accepted to [facility] today  · Continue Aricept 10 mg daily and Memantine 10 mg BID  · Continue Seroquel 12 5 mg BID for day time coverage, improvement of behaviors on this  Has not required PRN Seroquel at night for last 48 hours    Neck pain  Assessment & Plan  · Pt reporting some right sided neck pain on 9/13, however due to dementia unable to follow much command   · CT c-spine obtained with mild to moderate spondylosis, no additional findings   · Likely positional as pt has been resting in bed, continue Lidoderm patch    · This is now resolved, pt without any tenderness to c-spine, denies any pain    Atrial fibrillation ([facility] [address] )  Assessment & Plan  · EKG showed a   Fib with slow ventricular response   · Stable from prior EKG  · HR appears stable, intermittent mild bradycardia which has resolved   · Likely not maintained on a/c secondary to age and underlying dementia     Hypothyroidism  Assessment & Plan  · TSH elevated  · Free T4 WNL, free T3 low  · Therefore would continue home dose of levothyroxine 50 mcg daily for now  · Pt will need repeat thyroid function testing in 4-6 weeks     Mild protein-calorie malnutrition ([facility] [address] )  Assessment & Plan  Malnutrition Findings:         Clavicular prominence noted  BMI Findings: Body mass index is 23 67 kg/m²  As evidenced by muscle wasting  · Continue nutrition supplements     Anemia  Assessment & Plan  · Macrocytic anemia   · Hgb 10 6 today   · B12/folate and iron panel all WNL  · Homocysteine level WNL  · MMA level pending   · Trend CBC  · Pt also with pancytopenia, likely reactive vs  Chronic, which has now significantly improved  PVD (peripheral vascular disease) ([facility] [address] )  Assessment & Plan  · Continue ASA, statin  · Stable, distal pulses palpable      Discharging Physician / Practitioner: Gretchen Nelson PA-C  PCP: Corry Smith MD  Admission Date:   Admission Orders (From admission, onward)     Ordered        09/09/20 1537  Inpatient Admission  Once         09/09/20 1509  Place in Observation  Once,   Status:  Canceled                   Discharge Date: 09/16/20    Resolved Problems  Date Reviewed: 9/16/2020          Resolved    Hyperlipidemia 9/15/2020     Resolved by  Genaro Gregorio CRALEJANDRA    Hypokalemia 9/15/2020     Resolved by  Genaro Gregorio, [facility] Stay:  · Behavioral Health    Procedures Performed:   · CT head 9/10 - no acute intracranial abnormality  Chronic microangiopathic changes, similar from previous exam  · CT C-spine 9/14-mild to moderate spondylosis    No cervical spine fracture or traumatic malalignment    Significant Findings / Test Results:   · Pancytopenia improved  · SED rate 45  · TSH 10 672, free T4 normal, free T3 1 5  · COVID negative    Incidental Findings:   · None     Test Results Pending at Discharge (will require follow up):   · MMA level     Outpatient Tests Requested:  · Per PCP    Complications:  None    Reason for Admission: Worsening dementia behaviors     Hospital Course:     Jaspal Mo is a 80 y o  male patient with significant past medical history of dementia, atrial fibrillation, hypothyroidism, PVD, anemia who originally presented to the hospital on 9/9/2020 due to worsening behaviors/hypersexual behaviors at [facility]  Patient was sent in for further evaluation for placement in a dementia unit  Was described by patient's family members at the patient is typically pleasantly demented and 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  Patient had CT scan head obtained on admission that was unremarkable for any acute pathology  Patient was evaluated by Psychiatry twice during his admission  It was recommended that patient would not likely benefit from an inpatient [facility] psych facility but would rather benefit from a higher skilled level of care with dementia unit  Patient was evaluated by PT/OT and recommended short-term rehab  Patient did not experience any hypersexual behavior well in the hospital   He did have some mild agitation that at times required a continuous observation and restraints  However this was early in his hospitalization, he was placed on Seroquel 12 5 milligram b i d  With significant improvement of mood  He remained stable off of a 1-1 since 09/14  He was complaining of some neck pain throughout hospitalization that required CT C-spine that was negative  This resolved on discharge  Patient was cleared for discharge to [facility] dementia unit  Patient was discharged in stable condition  For additional information please refer to medical records  Medication changes include: Addition of Lidoderm patch daily to the neck, Seroquel 12 5 mg b i d  Please see above list of diagnoses and related plan for additional information  Condition at Discharge: fair     Discharge Day Visit / Exam:     Subjective:  Pt appears to be much more alert today   Continues to confabulate, however when asked about neck pain, pt denies and stated that he felt fine  This was confirmed on palpation of the neck that did not elicit any pain response  Calm and cooperative, no inappropriate behavior noted  Vitals: Blood Pressure: 140/90 (09/16/20 0752)  Pulse: 84 (09/16/20 0752)  Temperature: 97 6 °F (36 4 °C) (09/16/20 0752)  Temp Source: Oral (09/16/20 0752)  Respirations: 18 (09/16/20 0752)  Height: 5' 8" (172 7 cm) (09/09/20 1700)  Weight - Scale: 70 6 kg (155 lb 10 3 oz) (09/09/20 1700)  SpO2: 95 % (09/16/20 0752)  Exam:   Physical Exam  Vitals signs reviewed  Constitutional:       General: He is not in acute distress  Appearance: He is not toxic-appearing  Comments: Pt is in no acute distress lying in his hospital bed resting comfortably  Baseline dementia and disorientation  More alert today  HENT:      Head: Normocephalic and atraumatic  Eyes:      Conjunctiva/sclera: Conjunctivae normal       Pupils: Pupils are equal, round, and reactive to light  Neck:      Musculoskeletal: Normal range of motion  No neck rigidity or muscular tenderness  Cardiovascular:      Rate and Rhythm: Normal rate  Rhythm irregular  Pulses: Normal pulses  Heart sounds: Normal heart sounds  Pulmonary:      Effort: Pulmonary effort is normal  No respiratory distress  Breath sounds: Normal breath sounds  No wheezing or rales  Abdominal:      General: Abdomen is flat  Bowel sounds are normal  There is no distension  Palpations: Abdomen is soft  Tenderness: There is no abdominal tenderness  There is no guarding  Musculoskeletal:         General: No swelling  Right lower leg: No edema  Left lower leg: No edema  Skin:     General: Skin is warm and dry  Findings: No erythema  Neurological:      Mental Status: He is alert  Discussion with Family:  Discussed with patient at bedside, however due to underlying dementia attempted to reach out to patient's daughter Kaelyn Martin over the phone    Was unable to reach, voicemail left with call back number  Case management did discuss with her earlier in the day regarding patient to be transferred at 12:30 p m  Discharge instructions/Information to patient and family:   See after visit summary for information provided to patient and family  Provisions for Follow-Up Care:  See after visit summary for information related to follow-up care and any pertinent home health orders  Disposition:     [address] at [address] [place] [address],[address] to [facility]:   · Not Applicable to this Patient - Not Applicable to this Patient    Planned Readmission:  None     Discharge Statement:  I spent 35 minutes discharging the patient  This time was spent on the day of discharge  I had direct contact with the patient on the day of discharge  Greater than 50% of the total time was spent examining patient, answering all patient questions, arranging and discussing plan of care with patient as well as directly providing post-discharge instructions  Additional time then spent on discharge activities  Discharge Medications:  See after visit summary for reconciled discharge medications provided to patient and family        ** Please Note: This note has been constructed using a voice recognition system **

## 2020-09-16 NOTE — CASE MANAGEMENT
CM contacted Yash Duffy to inform him that the only accepting dementia unit is at [facility]  CM contacted Ernestina at [phone] to inform her that [facility] accepted  CM informed her that [facility] cannot accept due to physically aggressive behaviors  CM informed her that  sent multiple referrals for Dementia Unit and [facility] is the only one that can accept  She is agreeable to [facility]  CM contacted [facility] and spoke with [place] in Admissions  CM informed her that pt's daughter accepted the bed offer  She said that pt will be in an isolation room for about 14 days  She said that she will call Ernestina to let her know  Covid test is needed  SLIM made aware  CM contacted [facility] and spoke with Marcus Mcmahon to schedule BLS  Awaiting call back with transport time

## 2023-07-21 NOTE — PLAN OF CARE
Problem: PHYSICAL THERAPY ADULT  Goal: Performs mobility at highest level of function for planned discharge setting  See evaluation for individualized goals  Description: Treatment/Interventions: Functional transfer training, LE strengthening/ROM, Therapeutic exercise, Endurance training, Bed mobility, Gait training, Patient/family training, Equipment eval/education  Equipment Recommended: Melia Santillan       See flowsheet documentation for full assessment, interventions and recommendations  Outcome: Not Progressing  Note: Prognosis: Fair  Problem List: Decreased strength, Decreased endurance, Impaired balance, Decreased mobility, Decreased cognition, Decreased safety awareness  Assessment: Pt seen for PT treatment session this date with interventions consisting of Therapeutic exercise consisting of: AROM and AAROM 20 reps B LE in supine position  Pt agreeable to PT treatment session upon arrival, pt found supine in bed w/ HOB elevated, in no apparent distress and difficulty staying awake  In comparison to previous session, pt with no improvements as evidenced by decreased alertness and cognition  Post session: pt returned BTB, all needs in reach and RN notified of session findings/recommendations Continue to recommend STR at time of d/c in order to maximize pt's functional independence and safety w/ mobility  Pt continues to be functioning below baseline level, and remains limited 2* factors listed above and including decreased functional activity tolerance and decreased functional mobility  PT will continue to see pt while here in order to address the deficits listed above and provide interventions consistent w/ POC in effort to achieve STGs  Barriers to Discharge: Decreased caregiver support     PT Discharge Recommendation: Post-Acute Rehabilitation Services     PT - OK to Discharge: Yes(when  medically stable , if to STR)    See flowsheet documentation for full assessment  H Plasty Text: Given the location of the defect, shape of the defect and the proximity to free margins a H-plasty was deemed most appropriate for repair.  Using a sterile surgical marker, the appropriate advancement arms of the H-plasty were drawn incorporating the defect and placing the expected incisions within the relaxed skin tension lines where possible. The area thus outlined was incised deep to adipose tissue with a #15 scalpel blade. The skin margins were undermined to an appropriate distance in all directions utilizing iris scissors.  The opposing advancement arms were then advanced into place in opposite direction and anchored with interrupted buried subcutaneous sutures.